# Patient Record
Sex: FEMALE | Race: WHITE | NOT HISPANIC OR LATINO
[De-identification: names, ages, dates, MRNs, and addresses within clinical notes are randomized per-mention and may not be internally consistent; named-entity substitution may affect disease eponyms.]

---

## 2018-08-29 PROBLEM — Z00.00 ENCOUNTER FOR PREVENTIVE HEALTH EXAMINATION: Status: ACTIVE | Noted: 2018-08-29

## 2018-09-25 ENCOUNTER — APPOINTMENT (OUTPATIENT)
Dept: ORTHOPEDIC SURGERY | Facility: CLINIC | Age: 83
End: 2018-09-25

## 2022-03-06 ENCOUNTER — INPATIENT (INPATIENT)
Facility: HOSPITAL | Age: 87
LOS: 8 days | Discharge: HOME | End: 2022-03-15
Attending: INTERNAL MEDICINE | Admitting: INTERNAL MEDICINE
Payer: MEDICARE

## 2022-03-06 VITALS
WEIGHT: 199.96 LBS | RESPIRATION RATE: 19 BRPM | TEMPERATURE: 101 F | SYSTOLIC BLOOD PRESSURE: 165 MMHG | DIASTOLIC BLOOD PRESSURE: 84 MMHG | OXYGEN SATURATION: 97 % | HEART RATE: 112 BPM

## 2022-03-06 DIAGNOSIS — K52.9 NONINFECTIVE GASTROENTERITIS AND COLITIS, UNSPECIFIED: ICD-10-CM

## 2022-03-06 DIAGNOSIS — N39.0 URINARY TRACT INFECTION, SITE NOT SPECIFIED: ICD-10-CM

## 2022-03-06 DIAGNOSIS — A41.9 SEPSIS, UNSPECIFIED ORGANISM: ICD-10-CM

## 2022-03-06 DIAGNOSIS — E86.0 DEHYDRATION: ICD-10-CM

## 2022-03-06 LAB
ALBUMIN SERPL ELPH-MCNC: 3 G/DL — LOW (ref 3.3–5)
ALP SERPL-CCNC: 108 U/L — SIGNIFICANT CHANGE UP (ref 40–120)
ALT FLD-CCNC: 13 U/L — SIGNIFICANT CHANGE UP (ref 12–78)
ANION GAP SERPL CALC-SCNC: 3 MMOL/L — LOW (ref 5–17)
APPEARANCE UR: ABNORMAL
APTT BLD: 32 SEC — SIGNIFICANT CHANGE UP (ref 27.5–35.5)
AST SERPL-CCNC: 21 U/L — SIGNIFICANT CHANGE UP (ref 15–37)
BACTERIA # UR AUTO: ABNORMAL
BASOPHILS # BLD AUTO: 0 K/UL — SIGNIFICANT CHANGE UP (ref 0–0.2)
BASOPHILS NFR BLD AUTO: 0 % — SIGNIFICANT CHANGE UP (ref 0–2)
BILIRUB SERPL-MCNC: 0.4 MG/DL — SIGNIFICANT CHANGE UP (ref 0.2–1.2)
BILIRUB UR-MCNC: NEGATIVE — SIGNIFICANT CHANGE UP
BUN SERPL-MCNC: 24 MG/DL — HIGH (ref 7–23)
CALCIUM SERPL-MCNC: 9.3 MG/DL — SIGNIFICANT CHANGE UP (ref 8.5–10.1)
CHLORIDE SERPL-SCNC: 110 MMOL/L — HIGH (ref 96–108)
CO2 SERPL-SCNC: 30 MMOL/L — SIGNIFICANT CHANGE UP (ref 22–31)
COLOR SPEC: YELLOW — SIGNIFICANT CHANGE UP
CREAT SERPL-MCNC: 1.02 MG/DL — SIGNIFICANT CHANGE UP (ref 0.5–1.3)
DIFF PNL FLD: ABNORMAL
EGFR: 54 ML/MIN/1.73M2 — LOW
EOSINOPHIL # BLD AUTO: 0.14 K/UL — SIGNIFICANT CHANGE UP (ref 0–0.5)
EOSINOPHIL NFR BLD AUTO: 1 % — SIGNIFICANT CHANGE UP (ref 0–6)
EPI CELLS # UR: SIGNIFICANT CHANGE UP
FLUAV AG NPH QL: SIGNIFICANT CHANGE UP
FLUBV AG NPH QL: SIGNIFICANT CHANGE UP
GLUCOSE SERPL-MCNC: 122 MG/DL — HIGH (ref 70–99)
GLUCOSE UR QL: NEGATIVE MG/DL — SIGNIFICANT CHANGE UP
HCT VFR BLD CALC: 39.1 % — SIGNIFICANT CHANGE UP (ref 34.5–45)
HGB BLD-MCNC: 12.3 G/DL — SIGNIFICANT CHANGE UP (ref 11.5–15.5)
INR BLD: 0.98 RATIO — SIGNIFICANT CHANGE UP (ref 0.88–1.16)
KETONES UR-MCNC: NEGATIVE — SIGNIFICANT CHANGE UP
LACTATE SERPL-SCNC: 1.7 MMOL/L — SIGNIFICANT CHANGE UP (ref 0.7–2)
LACTATE SERPL-SCNC: 2.3 MMOL/L — HIGH (ref 0.7–2)
LEUKOCYTE ESTERASE UR-ACNC: ABNORMAL
LYMPHOCYTES # BLD AUTO: 0.14 K/UL — LOW (ref 1–3.3)
LYMPHOCYTES # BLD AUTO: 1 % — LOW (ref 13–44)
MANUAL SMEAR VERIFICATION: SIGNIFICANT CHANGE UP
MCHC RBC-ENTMCNC: 29.3 PG — SIGNIFICANT CHANGE UP (ref 27–34)
MCHC RBC-ENTMCNC: 31.5 G/DL — LOW (ref 32–36)
MCV RBC AUTO: 93.1 FL — SIGNIFICANT CHANGE UP (ref 80–100)
MONOCYTES # BLD AUTO: 0.83 K/UL — SIGNIFICANT CHANGE UP (ref 0–0.9)
MONOCYTES NFR BLD AUTO: 6 % — SIGNIFICANT CHANGE UP (ref 2–14)
NEUTROPHILS # BLD AUTO: 12.77 K/UL — HIGH (ref 1.8–7.4)
NEUTROPHILS NFR BLD AUTO: 92 % — HIGH (ref 43–77)
NITRITE UR-MCNC: POSITIVE
NRBC # BLD: 0 /100 — SIGNIFICANT CHANGE UP (ref 0–0)
NRBC # BLD: SIGNIFICANT CHANGE UP /100 WBCS (ref 0–0)
PH UR: 6.5 — SIGNIFICANT CHANGE UP (ref 5–8)
PLAT MORPH BLD: NORMAL — SIGNIFICANT CHANGE UP
PLATELET # BLD AUTO: 199 K/UL — SIGNIFICANT CHANGE UP (ref 150–400)
POTASSIUM SERPL-MCNC: 3.9 MMOL/L — SIGNIFICANT CHANGE UP (ref 3.5–5.3)
POTASSIUM SERPL-SCNC: 3.9 MMOL/L — SIGNIFICANT CHANGE UP (ref 3.5–5.3)
PROCALCITONIN SERPL-MCNC: 0.45 NG/ML — HIGH (ref 0.02–0.1)
PROT SERPL-MCNC: 7.8 GM/DL — SIGNIFICANT CHANGE UP (ref 6–8.3)
PROT UR-MCNC: 30 MG/DL
PROTHROM AB SERPL-ACNC: 11.7 SEC — SIGNIFICANT CHANGE UP (ref 10.5–13.4)
RBC # BLD: 4.2 M/UL — SIGNIFICANT CHANGE UP (ref 3.8–5.2)
RBC # FLD: 15.1 % — HIGH (ref 10.3–14.5)
RBC BLD AUTO: NORMAL — SIGNIFICANT CHANGE UP
RBC CASTS # UR COMP ASSIST: ABNORMAL /HPF (ref 0–4)
SARS-COV-2 RNA SPEC QL NAA+PROBE: SIGNIFICANT CHANGE UP
SODIUM SERPL-SCNC: 143 MMOL/L — SIGNIFICANT CHANGE UP (ref 135–145)
SP GR SPEC: 1.01 — SIGNIFICANT CHANGE UP (ref 1.01–1.02)
UROBILINOGEN FLD QL: NEGATIVE MG/DL — SIGNIFICANT CHANGE UP
WBC # BLD: 13.88 K/UL — HIGH (ref 3.8–10.5)
WBC # FLD AUTO: 13.88 K/UL — HIGH (ref 3.8–10.5)
WBC UR QL: ABNORMAL

## 2022-03-06 PROCEDURE — 93010 ELECTROCARDIOGRAM REPORT: CPT

## 2022-03-06 PROCEDURE — 99285 EMERGENCY DEPT VISIT HI MDM: CPT

## 2022-03-06 PROCEDURE — 74177 CT ABD & PELVIS W/CONTRAST: CPT | Mod: 26,MA

## 2022-03-06 PROCEDURE — 71045 X-RAY EXAM CHEST 1 VIEW: CPT | Mod: 26

## 2022-03-06 RX ORDER — SODIUM CHLORIDE 9 MG/ML
2200 INJECTION, SOLUTION INTRAVENOUS ONCE
Refills: 0 | Status: DISCONTINUED | OUTPATIENT
Start: 2022-03-06 | End: 2022-03-06

## 2022-03-06 RX ORDER — HEPARIN SODIUM 5000 [USP'U]/ML
5000 INJECTION INTRAVENOUS; SUBCUTANEOUS EVERY 12 HOURS
Refills: 0 | Status: DISCONTINUED | OUTPATIENT
Start: 2022-03-06 | End: 2022-03-15

## 2022-03-06 RX ORDER — SODIUM CHLORIDE 9 MG/ML
1000 INJECTION, SOLUTION INTRAVENOUS ONCE
Refills: 0 | Status: COMPLETED | OUTPATIENT
Start: 2022-03-06 | End: 2022-03-06

## 2022-03-06 RX ORDER — METRONIDAZOLE 500 MG
500 TABLET ORAL ONCE
Refills: 0 | Status: COMPLETED | OUTPATIENT
Start: 2022-03-06 | End: 2022-03-06

## 2022-03-06 RX ORDER — PANTOPRAZOLE SODIUM 20 MG/1
40 TABLET, DELAYED RELEASE ORAL ONCE
Refills: 0 | Status: COMPLETED | OUTPATIENT
Start: 2022-03-06 | End: 2022-03-06

## 2022-03-06 RX ORDER — PIPERACILLIN AND TAZOBACTAM 4; .5 G/20ML; G/20ML
3.38 INJECTION, POWDER, LYOPHILIZED, FOR SOLUTION INTRAVENOUS ONCE
Refills: 0 | Status: COMPLETED | OUTPATIENT
Start: 2022-03-06 | End: 2022-03-06

## 2022-03-06 RX ORDER — ONDANSETRON 8 MG/1
4 TABLET, FILM COATED ORAL EVERY 8 HOURS
Refills: 0 | Status: DISCONTINUED | OUTPATIENT
Start: 2022-03-06 | End: 2022-03-15

## 2022-03-06 RX ORDER — METRONIDAZOLE 500 MG
500 TABLET ORAL EVERY 8 HOURS
Refills: 0 | Status: DISCONTINUED | OUTPATIENT
Start: 2022-03-06 | End: 2022-03-08

## 2022-03-06 RX ORDER — ACETAMINOPHEN 500 MG
650 TABLET ORAL EVERY 6 HOURS
Refills: 0 | Status: DISCONTINUED | OUTPATIENT
Start: 2022-03-06 | End: 2022-03-15

## 2022-03-06 RX ORDER — ONDANSETRON 8 MG/1
4 TABLET, FILM COATED ORAL ONCE
Refills: 0 | Status: COMPLETED | OUTPATIENT
Start: 2022-03-06 | End: 2022-03-06

## 2022-03-06 RX ORDER — PIPERACILLIN AND TAZOBACTAM 4; .5 G/20ML; G/20ML
3.38 INJECTION, POWDER, LYOPHILIZED, FOR SOLUTION INTRAVENOUS EVERY 8 HOURS
Refills: 0 | Status: DISCONTINUED | OUTPATIENT
Start: 2022-03-06 | End: 2022-03-07

## 2022-03-06 RX ORDER — ACETAMINOPHEN 500 MG
650 TABLET ORAL ONCE
Refills: 0 | Status: COMPLETED | OUTPATIENT
Start: 2022-03-06 | End: 2022-03-06

## 2022-03-06 RX ORDER — METRONIDAZOLE 500 MG
TABLET ORAL
Refills: 0 | Status: DISCONTINUED | OUTPATIENT
Start: 2022-03-06 | End: 2022-03-08

## 2022-03-06 RX ORDER — LANOLIN ALCOHOL/MO/W.PET/CERES
3 CREAM (GRAM) TOPICAL AT BEDTIME
Refills: 0 | Status: DISCONTINUED | OUTPATIENT
Start: 2022-03-06 | End: 2022-03-15

## 2022-03-06 RX ORDER — SODIUM CHLORIDE 9 MG/ML
1000 INJECTION INTRAMUSCULAR; INTRAVENOUS; SUBCUTANEOUS
Refills: 0 | Status: DISCONTINUED | OUTPATIENT
Start: 2022-03-06 | End: 2022-03-08

## 2022-03-06 RX ADMIN — ONDANSETRON 4 MILLIGRAM(S): 8 TABLET, FILM COATED ORAL at 08:29

## 2022-03-06 RX ADMIN — PANTOPRAZOLE SODIUM 40 MILLIGRAM(S): 20 TABLET, DELAYED RELEASE ORAL at 16:13

## 2022-03-06 RX ADMIN — HEPARIN SODIUM 5000 UNIT(S): 5000 INJECTION INTRAVENOUS; SUBCUTANEOUS at 14:00

## 2022-03-06 RX ADMIN — SODIUM CHLORIDE 1000 MILLILITER(S): 9 INJECTION, SOLUTION INTRAVENOUS at 08:30

## 2022-03-06 RX ADMIN — Medication 650 MILLIGRAM(S): at 10:00

## 2022-03-06 RX ADMIN — PIPERACILLIN AND TAZOBACTAM 200 GRAM(S): 4; .5 INJECTION, POWDER, LYOPHILIZED, FOR SOLUTION INTRAVENOUS at 12:56

## 2022-03-06 RX ADMIN — Medication 100 MILLIGRAM(S): at 15:58

## 2022-03-06 RX ADMIN — Medication 100 MILLIGRAM(S): at 22:22

## 2022-03-06 RX ADMIN — Medication 650 MILLIGRAM(S): at 08:30

## 2022-03-06 RX ADMIN — PIPERACILLIN AND TAZOBACTAM 200 GRAM(S): 4; .5 INJECTION, POWDER, LYOPHILIZED, FOR SOLUTION INTRAVENOUS at 22:22

## 2022-03-06 RX ADMIN — PIPERACILLIN AND TAZOBACTAM 200 GRAM(S): 4; .5 INJECTION, POWDER, LYOPHILIZED, FOR SOLUTION INTRAVENOUS at 16:09

## 2022-03-06 NOTE — H&P ADULT - HISTORY OF PRESENT ILLNESS
87yo, WF with h/o  HLD, Depression, HTN, Lasix for LE edema BIBEMS from care home  for nausea, vomiting  and diarrhea x 3 days. non-bloody diarrhea x 3 episodes per pt, vomiting and persistent nausea - no noted previous abdominal surgeries or scars on examination -non-distended - passing gas. Patient denies sick contacts.  Denies CP, SOB, PND, cough, palpitation, chills, weakness, abdominal pain, dysuria ,HA, dizziness.    In ER,  rectal temp of 103 F, low grade tachycardia 103 Sinus tachycardia on EKG.

## 2022-03-06 NOTE — ED PROVIDER NOTE - OBJECTIVE STATEMENT
"Chief Complaint   Patient presents with     RECHECK   chronic pain    initial /87   Pulse 93   Temp 97  F (36.1  C) (Tympanic)   Resp 16   Ht 1.74 m (5' 8.5\")   Wt 111.1 kg (245 lb)   SpO2 96%   BMI 36.71 kg/m   Estimated body mass index is 36.71 kg/m  as calculated from the following:    Height as of this encounter: 1.74 m (5' 8.5\").    Weight as of this encounter: 111.1 kg (245 lb).  BP completed using cuff size: large.   R arm      Health Maintenance that is potentially due pending provider review:  NONE    n/a    Brian Sheffield ma  " 86 F With hx of HLD, Depression, HTN, on lasix for LE edema BIBEMS from [facility]  for nausea, vomiting nbnb and diarrhea x 3 days. non-bloody diarrhea x 3 episodes per pt, vomiting and persistent nausea - no noted previous abdominal surgeries or scars on examination -non-distended - passing gas. Patient denies sick contacts. noted to have rectal temp of 103 F, low grade tachycardia 103 Sinus tachycardia on EKG.

## 2022-03-06 NOTE — ED PROVIDER NOTE - NS ED ROS FT
Constitutional: See HPI.  Eyes: No visual changes, eye pain or discharge. No Photophobia  ENMT: No hearing changes, pain, discharge or infections. No neck pain or stiffness. No limited ROM  Cardiac: No SOB or edema. No chest pain with exertion.  Respiratory: No cough or respiratory distress.   GI: see hpi  : No dysuria, frequency or burning. No Discharge  MS: No myalgia, muscle weakness, joint pain or back pain.  Neuro: No headache or weakness. No LOC.  Skin: No skin rash.  Except as documented in the HPI, all other systems are negative.

## 2022-03-06 NOTE — H&P ADULT - NSHPPHYSICALEXAM_GEN_ALL_CORE
PHYSICAL EXAM:  GENERAL: NAD,   HEAD:  Atraumatic, Normocephalic  EYES:  PERRLA, conjunctiva and sclera clear  ENMT:   Dry mucous membranes, No lesions  NECK: Supple,   NERVOUS SYSTEM:  Alert ; Motor Strength 4/5   CHEST/LUNG: Clear bilaterally; No rales, rhonchi, wheezing, or rubs  HEART: Regular rate and rhythm; No murmurs, rubs, or gallops  ABDOMEN: Soft, Nontender, Nondistended; Bowel sounds present  EXTREMITIES:  No clubbing, cyanosis.   1+ edema  SKIN: No rashes or lesions

## 2022-03-06 NOTE — PATIENT PROFILE ADULT - FALL HARM RISK - HARM RISK INTERVENTIONS
Assistance with ambulation/Assistance OOB with selected safe patient handling equipment/Communicate Risk of Fall with Harm to all staff/Discuss with provider need for PT consult/Monitor gait and stability/Provide patient with walking aids - walker, cane, crutches/Reinforce activity limits and safety measures with patient and family/Tailored Fall Risk Interventions/Visual Cue: Yellow wristband and red socks/Bed in lowest position, wheels locked, appropriate side rails in place/Call bell, personal items and telephone in reach/Instruct patient to call for assistance before getting out of bed or chair/Non-slip footwear when patient is out of bed/[place] to call system/Physically safe environment - no spills, clutter or unnecessary equipment/Purposeful Proactive Rounding/Room/bathroom lighting operational, light cord in reach Assistance with ambulation/Assistance OOB with selected safe patient handling equipment/Communicate Risk of Fall with Harm to all staff/Discuss with provider need for PT consult/Monitor gait and stability/Reinforce activity limits and safety measures with patient and family/Tailored Fall Risk Interventions/Visual Cue: Yellow wristband and red socks/Bed in lowest position, wheels locked, appropriate side rails in place/Call bell, personal items and telephone in reach/Instruct patient to call for assistance before getting out of bed or chair/Non-slip footwear when patient is out of bed/[place] to call system/Physically safe environment - no spills, clutter or unnecessary equipment/Purposeful Proactive Rounding/Room/bathroom lighting operational, light cord in reach Assistance with ambulation/Assistance OOB with selected safe patient handling equipment/Communicate Risk of Fall with Harm to all staff/Discuss with provider need for PT consult/Monitor gait and stability/Provide patient with walking aids - walker, cane, crutches/Reinforce activity limits and safety measures with patient and family/Tailored Fall Risk Interventions/Visual Cue: Yellow wristband and red socks/Bed in lowest position, wheels locked, appropriate side rails in place/Call bell, personal items and telephone in reach/Instruct patient to call for assistance before getting out of bed or chair/Non-slip footwear when patient is out of bed/[place] to call system/Physically safe environment - no spills, clutter or unnecessary equipment/Purposeful Proactive Rounding/Room/bathroom lighting operational, light cord in reach

## 2022-03-06 NOTE — H&P ADULT - NSHPLABSRESULTS_GEN_ALL_CORE
LABS:                        12.3   13.88 )-----------( 199      ( 06 Mar 2022 08:47 )             39.1     03-06    143  |  110<H>  |  24<H>  ----------------------------<  122<H>  3.9   |  30  |  1.02    Ca    9.3      06 Mar 2022 08:47    TPro  7.8  /  Alb  3.0<L>  /  TBili  0.4  /  DBili  x   /  AST  21  /  ALT  13  /  AlkPhos  108  03-06    PT/INR - ( 06 Mar 2022 08:47 )   PT: 11.7 sec;   INR: 0.98 ratio         PTT - ( 06 Mar 2022 08:47 )  PTT:32.0 sec  Urinalysis Basic - ( 06 Mar 2022 10:11 )    Color: Yellow / Appearance: Slightly Turbid / S.010 / pH: x  Gluc: x / Ketone: Negative  / Bili: Negative / Urobili: Negative mg/dL   Blood: x / Protein: 30 mg/dL / Nitrite: Positive   Leuk Esterase: Moderate / RBC: 11-25 /HPF / WBC 11-25   Sq Epi: x / Non Sq Epi: Few / Bacteria: Many      Lactate, Blood: 1.7 mmol/L (22 @ 11:45)   Lactate, Blood: 2.3: Elevated lactate. Consider ordering follow-up lactate to trend. mmol/L (22 @ 08:47)         < from: CT Abdomen and Pelvis w/ IV Cont (22 @ 10:22) >    FINDINGS:  LOWER CHEST: There is a moderate amount of fat herniating through the   esophageal hiatus.    LIVER: Subcentimeter hypodensity right lobe.  BILE DUCTS: Normal caliber.  GALLBLADDER: Removed.  SPLEEN: Within normal limits.  PANCREAS: There is a 1.7 cm cystic lesion in the pancreatic tail.  ADRENALS: Within normal limits.  KIDNEYS/URETERS: There is a 2 mm nonobstructing left lower pole renal   calculus. There are bilateral renal cysts and subcentimeter renal   hypodensities with the largest cyst measuring 4.9 cm in the upper pole of   the right kidney.    BLADDER: Diffuse bladder wall thickening.  REPRODUCTIVE ORGANS: Uterus and adnexa within normal limits.    BOWEL: There are a few mildly dilated loops of small bowel in the   midabdomen which demonstrate mild thickening including on series 2 image   69. There is no discrete transition zone to suggest bowel obstruction and   findings may be on the basis of enteritis.  PERITONEUM: No ascites.  VESSELS: Within normal limits.  RETROPERITONEUM/LYMPH NODES: No lymphadenopathy.  ABDOMINAL WALL: Small fat-containing umbilical hernia.  BONES: Old right inferior pubic ramus fracture.    IMPRESSION:  Few mildly thickened and dilated loops of small bowel in the upper to mid   abdomen suggesting enteritis. No transition zone to suggest bowel   obstruction.    Thickened bladder wall. Correlation with urinalysis recommended.    Fat herniation through the esophageal hiatus into the middle mediastinum.    1.7 cm cystic lesion pancreatic tail.Correlation with contrast enhanced   abdominal MRI recommended as an outpatient.      < end of copied text >

## 2022-03-06 NOTE — ED ADULT NURSE NOTE - NSIMPLEMENTINTERV_GEN_ALL_ED
Implemented All Fall with Harm Risk Interventions:  [place] to call system. Call bell, personal items and telephone within reach. Instruct patient to call for assistance. Room bathroom lighting operational. Non-slip footwear when patient is off stretcher. Physically safe environment: no spills, clutter or unnecessary equipment. Stretcher in lowest position, wheels locked, appropriate side rails in place. Provide visual cue, wrist band, yellow gown, etc. Monitor gait and stability. Monitor for mental status changes and reorient to person, place, and time. Review medications for side effects contributing to fall risk. Reinforce activity limits and safety measures with patient and family. Provide visual clues: red socks.

## 2022-03-06 NOTE — ED PROVIDER NOTE - PHYSICAL EXAMINATION
VITAL SIGNS: I have reviewed nursing notes and confirm.  CONSTITUTIONAL: well-appearing, non-toxic  SKIN: Warm dry, normal skin turgor  HEAD: NCAT  EYES: EOMI, PERRLA, no scleral icterus  ENT: Moist mucous membranes, normal pharynx   NECK: Supple; non tender. Full ROM.  CARD: tachycardia , no murmurs, rubs or gallops  RESP: clear to ausculation b/l.  No rales, rhonchi, or wheezing.  ABD: soft, + BS, non-tender, non-distended, no rebound or guarding. No CVA tenderness  EXT: Full ROM, no bony tenderness, no pedal edema, no calf tenderness  NEURO: non-ofcal   PSYCH: Cooperative, appropriate.

## 2022-03-06 NOTE — ED ADULT NURSE NOTE - OBJECTIVE STATEMENT
pt is 85 y/o female c/c of weakness, nausea, flu like symptoms x couple days. upon receiving pt, found pt covered in large light brown feces. pt changed with 2 assist. AAOX3, appears lethargic, partial facial droop noted. skin hot to touch. septic workup initiated,. pt rectal temp 103, skin intact. placed on cardiac monitor/cont pulse ox, on 2L O2 NC, bilateral anterior chest diminished lung sounds, no increased work of breathing. pt states lives at home with children, non ambulatory at this time. fall with harm risk and safety precautions maintained. unknown PMH.

## 2022-03-07 LAB
ALBUMIN SERPL ELPH-MCNC: 2.3 G/DL — LOW (ref 3.3–5)
ALP SERPL-CCNC: 71 U/L — SIGNIFICANT CHANGE UP (ref 40–120)
ALT FLD-CCNC: 15 U/L — SIGNIFICANT CHANGE UP (ref 12–78)
ANION GAP SERPL CALC-SCNC: 5 MMOL/L — SIGNIFICANT CHANGE UP (ref 5–17)
AST SERPL-CCNC: 25 U/L — SIGNIFICANT CHANGE UP (ref 15–37)
BILIRUB SERPL-MCNC: 0.3 MG/DL — SIGNIFICANT CHANGE UP (ref 0.2–1.2)
BUN SERPL-MCNC: 18 MG/DL — SIGNIFICANT CHANGE UP (ref 7–23)
CALCIUM SERPL-MCNC: 8 MG/DL — LOW (ref 8.5–10.1)
CHLORIDE SERPL-SCNC: 111 MMOL/L — HIGH (ref 96–108)
CO2 SERPL-SCNC: 28 MMOL/L — SIGNIFICANT CHANGE UP (ref 22–31)
CREAT SERPL-MCNC: 0.92 MG/DL — SIGNIFICANT CHANGE UP (ref 0.5–1.3)
CULTURE RESULTS: SIGNIFICANT CHANGE UP
EGFR: 61 ML/MIN/1.73M2 — SIGNIFICANT CHANGE UP
GLUCOSE SERPL-MCNC: 109 MG/DL — HIGH (ref 70–99)
HCT VFR BLD CALC: 34 % — LOW (ref 34.5–45)
HGB BLD-MCNC: 10.3 G/DL — LOW (ref 11.5–15.5)
LACTATE SERPL-SCNC: 1.2 MMOL/L — SIGNIFICANT CHANGE UP (ref 0.7–2)
MCHC RBC-ENTMCNC: 28.9 PG — SIGNIFICANT CHANGE UP (ref 27–34)
MCHC RBC-ENTMCNC: 30.3 G/DL — LOW (ref 32–36)
MCV RBC AUTO: 95.2 FL — SIGNIFICANT CHANGE UP (ref 80–100)
NRBC # BLD: 0 /100 WBCS — SIGNIFICANT CHANGE UP (ref 0–0)
PLATELET # BLD AUTO: 143 K/UL — LOW (ref 150–400)
POTASSIUM SERPL-MCNC: 3.6 MMOL/L — SIGNIFICANT CHANGE UP (ref 3.5–5.3)
POTASSIUM SERPL-SCNC: 3.6 MMOL/L — SIGNIFICANT CHANGE UP (ref 3.5–5.3)
PROCALCITONIN SERPL-MCNC: 3.07 NG/ML — HIGH (ref 0.02–0.1)
PROT SERPL-MCNC: 6.3 GM/DL — SIGNIFICANT CHANGE UP (ref 6–8.3)
RBC # BLD: 3.57 M/UL — LOW (ref 3.8–5.2)
RBC # FLD: 15.5 % — HIGH (ref 10.3–14.5)
SODIUM SERPL-SCNC: 144 MMOL/L — SIGNIFICANT CHANGE UP (ref 135–145)
SPECIMEN SOURCE: SIGNIFICANT CHANGE UP
WBC # BLD: 6.12 K/UL — SIGNIFICANT CHANGE UP (ref 3.8–10.5)
WBC # FLD AUTO: 6.12 K/UL — SIGNIFICANT CHANGE UP (ref 3.8–10.5)

## 2022-03-07 RX ORDER — CEFTRIAXONE 500 MG/1
1000 INJECTION, POWDER, FOR SOLUTION INTRAMUSCULAR; INTRAVENOUS ONCE
Refills: 0 | Status: COMPLETED | OUTPATIENT
Start: 2022-03-07 | End: 2022-03-07

## 2022-03-07 RX ORDER — CEFTRIAXONE 500 MG/1
1000 INJECTION, POWDER, FOR SOLUTION INTRAMUSCULAR; INTRAVENOUS EVERY 24 HOURS
Refills: 0 | Status: DISCONTINUED | OUTPATIENT
Start: 2022-03-08 | End: 2022-03-10

## 2022-03-07 RX ORDER — CEFTRIAXONE 500 MG/1
INJECTION, POWDER, FOR SOLUTION INTRAMUSCULAR; INTRAVENOUS
Refills: 0 | Status: DISCONTINUED | OUTPATIENT
Start: 2022-03-07 | End: 2022-03-10

## 2022-03-07 RX ADMIN — Medication 100 MILLIGRAM(S): at 15:06

## 2022-03-07 RX ADMIN — HEPARIN SODIUM 5000 UNIT(S): 5000 INJECTION INTRAVENOUS; SUBCUTANEOUS at 05:22

## 2022-03-07 RX ADMIN — PIPERACILLIN AND TAZOBACTAM 200 GRAM(S): 4; .5 INJECTION, POWDER, LYOPHILIZED, FOR SOLUTION INTRAVENOUS at 05:21

## 2022-03-07 RX ADMIN — Medication 100 MILLIGRAM(S): at 05:21

## 2022-03-07 RX ADMIN — CEFTRIAXONE 100 MILLIGRAM(S): 500 INJECTION, POWDER, FOR SOLUTION INTRAMUSCULAR; INTRAVENOUS at 14:11

## 2022-03-07 RX ADMIN — SODIUM CHLORIDE 70 MILLILITER(S): 9 INJECTION INTRAMUSCULAR; INTRAVENOUS; SUBCUTANEOUS at 03:47

## 2022-03-07 RX ADMIN — Medication 100 MILLIGRAM(S): at 21:22

## 2022-03-07 RX ADMIN — HEPARIN SODIUM 5000 UNIT(S): 5000 INJECTION INTRAVENOUS; SUBCUTANEOUS at 17:33

## 2022-03-07 RX ADMIN — SODIUM CHLORIDE 70 MILLILITER(S): 9 INJECTION INTRAMUSCULAR; INTRAVENOUS; SUBCUTANEOUS at 18:12

## 2022-03-07 NOTE — PHARMACOTHERAPY INTERVENTION NOTE - COMMENTS
Recommended de-escalating from piperacillin/tazobactam to ceftriaxone (if concerned for bacterial enteritis/UTI and if no recent IV antibiotic use in the last 90 days) to be used concomitantly with metronidazole (avoids double anaerobic coverage by de-escalating piperacillin/tazobactam) for enteritis. Metronidazole was unsuspended to allow for single anaerobic coverage.

## 2022-03-07 NOTE — PHARMACOTHERAPY INTERVENTION NOTE - COMMENTS
Suspended metronidazole since the patient is on piperacillin/tazobactam and this combination provides double anaerobic coverage (unnecessary in this case, as per double anaerobic coverage guideline). Would recommend d/c metronidazole if continuing piperacillin/tazobactam, or de-escalating piperacillin/tazobactam to ceftriaxone (if true concern for UTI; no urinary symptoms mentioned) and continuing metronidazole (for enteritis). Suspended metronidazole since the patient is on piperacillin/tazobactam and this combination provides double anaerobic coverage (unnecessary in this case, as per double anaerobic coverage guideline). Would recommend d/c metronidazole if continuing piperacillin/tazobactam, or de-escalating piperacillin/tazobactam to ceftriaxone (if true concern for UTI and if no IV antibiotics in last 90 days; no urinary symptoms mentioned) and continuing metronidazole (for enteritis).

## 2022-03-07 NOTE — PHARMACOTHERAPY INTERVENTION NOTE - COMMENTS
Recommended stool culture/O&P and GI PCR since patient is being treated with antibiotics for enteritis.

## 2022-03-07 NOTE — CONSULT NOTE ADULT - ASSESSMENT
HPI:  87yo, WF with h/o  HLD, Depression, HTN, Lasix for LE edema BIBEMS from [facility]  for nausea, vomiting  and diarrhea x 3 days. non-bloody diarrhea x 3 episodes per pt, vomiting and persistent nausea - no noted previous abdominal surgeries or scars on examination -non-distended - passing gas. Patient denies sick contacts.  Denies CP, SOB, PND, cough, palpitation, chills, weakness, abdominal pain, dysuria ,HA, dizziness.    In ER,  rectal temp of 103 F, low grade tachycardia 103 Sinus tachycardia on EKG.     (06 Mar 2022 13:59)  ----- As Above -------- Patient is a fair historian. Could not contact family  Patient presented with N/V, abdominal pain and diarrhea x 3 days. Febrile (+)Non bloody. No one else with same symptoms. No history of IBD.   Prior to these symptoms, the patient denies melena, hematochezia, hematemesis, nausea, vomiting, abdominal pain, constipation, diarrhea, or change in bowel movements   Colonoscopy (?)       N/V, abdominal pain, diarrhea, fever, abnormal CT scan - c/w Gastroenteritis - Patient doing much better this afternoon. 1)  HPI:  85yo, WF with h/o  HLD, Depression, HTN, Lasix for LE edema BIBEMS from [facility]  for nausea, vomiting  and diarrhea x 3 days. non-bloody diarrhea x 3 episodes per pt, vomiting and persistent nausea - no noted previous abdominal surgeries or scars on examination -non-distended - passing gas. Patient denies sick contacts.  Denies CP, SOB, PND, cough, palpitation, chills, weakness, abdominal pain, dysuria ,HA, dizziness.    In ER,  rectal temp of 103 F, low grade tachycardia 103 Sinus tachycardia on EKG.     (06 Mar 2022 13:59)  ----- As Above -------- Patient is a fair historian. Could not contact family  Patient presented with N/V, abdominal pain and diarrhea x 3 days. Febrile (+)Non bloody. No one else with same symptoms. No history of IBD.   Prior to these symptoms, the patient denies melena, hematochezia, hematemesis, nausea, vomiting, abdominal pain, constipation, diarrhea, or change in bowel movements   Colonoscopy (?)       A) N/V, abdominal pain, diarrhea, fever, abnormal CT scan - c/w Gastroenteritis - Patient doing much better this afternoon. Tolerating clear liquids  1) Full liquid diet 2) CRP 3) Otherwise, supportive care 4) No need for antibiotics from GI point of view  B) Anemia - Acute drop probably from rehydration - Blood work ordered.   ==== Will again attempt to speak with family

## 2022-03-07 NOTE — CONSULT NOTE ADULT - SUBJECTIVE AND OBJECTIVE BOX
HPI:  87yo, WF with h/o  HLD, Depression, HTN, Lasix for LE edema BIBEMS from [facility]  for nausea, vomiting  and diarrhea x 3 days. non-bloody diarrhea x 3 episodes per pt, vomiting and persistent nausea - no noted previous abdominal surgeries or scars on examination -non-distended - passing gas. Patient denies sick contacts.  Denies CP, SOB, PND, cough, palpitation, chills, weakness, abdominal pain, dysuria ,HA, dizziness.    In ER,  rectal temp of 103 F, low grade tachycardia 103 Sinus tachycardia on EKG.     (06 Mar 2022 13:59)  ----- As Above -------- Patient is a fair historian. Could not contact family  Patient presented with N/V, abdominal pain and diarrhea x 3 days. Febrile (+)Non bloody. No one else with same symptoms. No history of IBD.   Prior to these symptoms, the patient denies melena, hematochezia, hematemesis, nausea, vomiting, abdominal pain, constipation, diarrhea, or change in bowel movements   Colonoscopy (?)       PAST MEDICAL & SURGICAL HISTORY:       MEDICATIONS  (STANDING):  cefTRIAXone   IVPB      heparin   Injectable 5000 Unit(s) SubCutaneous every 12 hours  metroNIDAZOLE  IVPB      metroNIDAZOLE  IVPB 500 milliGRAM(s) IV Intermittent every 8 hours  sodium chloride 0.9%. 1000 milliLiter(s) (70 mL/Hr) IV Continuous <Continuous>    MEDICATIONS  (PRN):  acetaminophen     Tablet .. 650 milliGRAM(s) Oral every 6 hours PRN Temp greater or equal to 38C (100.4F), Mild Pain (1 - 3)  aluminum hydroxide/magnesium hydroxide/simethicone Suspension 30 milliLiter(s) Oral every 4 hours PRN Dyspepsia  melatonin 3 milliGRAM(s) Oral at bedtime PRN Insomnia  ondansetron Injectable 4 milliGRAM(s) IV Push every 8 hours PRN Nausea and/or Vomiting      Allergies    No Known Allergies    Intolerances        FAMILY HISTORY:      REVIEW OF SYSTEMS:    CONSTITUTIONAL: No fever, weight loss,   EYES: No eye pain, visual disturbances, or discharge  ENMT:  No difficulty hearing, tinnitus, vertigo; No sinus or throat pain  NECK: No pain or stiffness  BREASTS: No pain, masses, or nipple discharge  RESPIRATORY: No cough, wheezing, chills or hemoptysis; No shortness of breath  CARDIOVASCULAR: No chest pain, palpitations, dizziness, or leg swelling  GASTROINTESTINAL: See above   GENITOURINARY: No dysuria, frequency, hematuria, or incontinence  NEUROLOGICAL: No headaches, memory loss, loss of strength, numbness, or tremors  SKIN: No itching, burning, rashes, or lesions   LYMPH NODES: No enlarged glands  ENDOCRINE: No heat or cold intolerance; No hair loss  MUSCULOSKELETAL: No joint pain or swelling; No muscle, back, or extremity pain  PSYCHIATRIC: No depression, anxiety, mood swings, or difficulty sleeping  HEME/LYMPH: No easy bruising, or bleeding gums  ALLERGY AND IMMUNOLOGIC: No hives or eczema          SOCIAL HISTORY:    FAMILY HISTORY:      Vital Signs Last 24 Hrs  T(C): 37 (07 Mar 2022 11:18), Max: 37.8 (07 Mar 2022 00:23)  T(F): 98.6 (07 Mar 2022 11:18), Max: 100 (07 Mar 2022 00:23)  HR: 70 (07 Mar 2022 11:18) (70 - 80)  BP: 138/78 (07 Mar 2022 11:18) (134/74 - 138/78)  BP(mean): --  RR: 20 (07 Mar 2022 11:18) (16 - 20)  SpO2: 98% (07 Mar 2022 11:18) (95% - 98%)    PHYSICAL EXAM:    GENERAL: NAD, well-groomed, well-developed  HEAD:  Atraumatic, Normocephalic  EYES: EOMI, PERRLA, conjunctiva and sclera clear  NECK: Supple, No JVD, Normal thyroid  NERVOUS SYSTEM:  Alert & Oriented X3, Good concentration;  CHEST/LUNG: Clear to percussion bilaterally; No rales, rhonchi, wheezing, or rubs  HEART: Regular rate and rhythm; No murmurs, rubs, or gallops  ABDOMEN: Soft, Nontender, Nondistended; Bowel sounds present  EXTREMITIES:  2+ Peripheral Pulses, No clubbing, cyanosis, or edema  LYMPH: No lymphadenopathy noted   RECTAL: Deferred   SKIN: No rashes or lesions    LABS:                        10.3   6.12  )-----------( 143      ( 07 Mar 2022 06:26 )             34.0       CBC:  - @ 06:26  WBC  6.12  HGB 10.3  HCT 34.0 Plate 143  MCV 95.2  03-06 @ 08:47  WBC  13.88  HGB 12.3  HCT 39.1 Plate 199  MCV 93.1           07 Mar 2022 06:26    144    |  111    |  18     ----------------------------<  109    3.6     |  28     |  0.92   06 Mar 2022 08:47    143    |  110    |  24     ----------------------------<  122    3.9     |  30     |  1.02     Ca    8.0        07 Mar 2022 06:26  Ca    9.3        06 Mar 2022 08:47    TPro  6.3    /  Alb  2.3    /  TBili  0.3    /  DBili  x      /  AST  25     /  ALT  15     /  AlkPhos  71     07 Mar 2022 06:26  TPro  7.8    /  Alb  3.0    /  TBili  0.4    /  DBili  x      /  AST  21     /  ALT  13     /  AlkPhos  108    06 Mar 2022 08:47    PT/INR - ( 06 Mar 2022 08:47 )   PT: 11.7 sec;   INR: 0.98 ratio         PTT - ( 06 Mar 2022 08:47 )  PTT:32.0 sec  Urinalysis Basic - ( 06 Mar 2022 10:11 )    Color: Yellow / Appearance: Slightly Turbid / S.010 / pH: x  Gluc: x / Ketone: Negative  / Bili: Negative / Urobili: Negative mg/dL   Blood: x / Protein: 30 mg/dL / Nitrite: Positive   Leuk Esterase: Moderate / RBC: 11-25 /HPF / WBC 11-25   Sq Epi: x / Non Sq Epi: Few / Bacteria: Many          RADIOLOGY & ADDITIONAL STUDIES:  < from: CT Abdomen and Pelvis w/ IV Cont (22 @ 10:22) >    ACC: [id number] EXAM:  CT ABDOMEN AND PELVIS IC                          PROCEDURE DATE:  2022          INTERPRETATION:  CLINICAL INFORMATION: Nausea, vomiting and fever. Poor   historian.    COMPARISON: None.    CONTRAST/COMPLICATIONS:  IV Contrast: Omnipaque 350  99 cc administered   1 cc discarded  Oral Contrast: NONE  Complications: None reported at time of study completion    PROCEDURE:  CT of the Abdomen and Pelvis was performed.  Sagittal and coronal reformats were performed.    FINDINGS:  LOWER CHEST: There is a moderate amount of fat herniating through the   esophageal hiatus.    LIVER: Subcentimeter hypodensity right lobe.  BILE DUCTS: Normal caliber.  GALLBLADDER: Removed.  SPLEEN: Within normal limits.  PANCREAS: There is a 1.7 cm cystic lesion in the pancreatic tail.  ADRENALS: Within normal limits.  KIDNEYS/URETERS: There is a 2 mm nonobstructing left lower pole renal   calculus. There are bilateral renal cysts and subcentimeter renal   hypodensities with the largest cyst measuring 4.9 cm in the upper pole of   the right kidney.    BLADDER: Diffuse bladder wall thickening.  REPRODUCTIVE ORGANS: Uterus and adnexa within normal limits.    BOWEL: There are a few mildly dilated loops of small bowel in the   midabdomen which demonstrate mild thickening including on series 2 image   69. There is no discrete transition zone to suggest bowel obstruction and   findings may be on the basis of enteritis.  PERITONEUM: No ascites.  VESSELS: Within normal limits.  RETROPERITONEUM/LYMPH NODES: No lymphadenopathy.  ABDOMINAL WALL: Small fat-containing umbilical hernia.  BONES: Old right inferior pubic ramus fracture.      < end of copied text >

## 2022-03-08 LAB
ANION GAP SERPL CALC-SCNC: 4 MMOL/L — LOW (ref 5–17)
BUN SERPL-MCNC: 11 MG/DL — SIGNIFICANT CHANGE UP (ref 7–23)
C DIFF BY PCR RESULT: SIGNIFICANT CHANGE UP
C DIFF TOX GENS STL QL NAA+PROBE: SIGNIFICANT CHANGE UP
CALCIUM SERPL-MCNC: 7.9 MG/DL — LOW (ref 8.5–10.1)
CHLORIDE SERPL-SCNC: 113 MMOL/L — HIGH (ref 96–108)
CO2 SERPL-SCNC: 25 MMOL/L — SIGNIFICANT CHANGE UP (ref 22–31)
CREAT SERPL-MCNC: 0.66 MG/DL — SIGNIFICANT CHANGE UP (ref 0.5–1.3)
CRP SERPL-MCNC: 41 MG/L — HIGH
CULTURE RESULTS: SIGNIFICANT CHANGE UP
CULTURE RESULTS: SIGNIFICANT CHANGE UP
EGFR: 86 ML/MIN/1.73M2 — SIGNIFICANT CHANGE UP
FOLATE SERPL-MCNC: 7 NG/ML — SIGNIFICANT CHANGE UP
GLUCOSE SERPL-MCNC: 97 MG/DL — SIGNIFICANT CHANGE UP (ref 70–99)
HCT VFR BLD CALC: 34 % — LOW (ref 34.5–45)
HGB BLD-MCNC: 10.4 G/DL — LOW (ref 11.5–15.5)
MCHC RBC-ENTMCNC: 29 PG — SIGNIFICANT CHANGE UP (ref 27–34)
MCHC RBC-ENTMCNC: 30.6 G/DL — LOW (ref 32–36)
MCV RBC AUTO: 94.7 FL — SIGNIFICANT CHANGE UP (ref 80–100)
NRBC # BLD: 0 /100 WBCS — SIGNIFICANT CHANGE UP (ref 0–0)
PLATELET # BLD AUTO: 154 K/UL — SIGNIFICANT CHANGE UP (ref 150–400)
POTASSIUM SERPL-MCNC: 3.5 MMOL/L — SIGNIFICANT CHANGE UP (ref 3.5–5.3)
POTASSIUM SERPL-SCNC: 3.5 MMOL/L — SIGNIFICANT CHANGE UP (ref 3.5–5.3)
PROCALCITONIN SERPL-MCNC: 1.87 NG/ML — HIGH (ref 0.02–0.1)
RBC # BLD: 3.59 M/UL — LOW (ref 3.8–5.2)
RBC # FLD: 15.3 % — HIGH (ref 10.3–14.5)
SODIUM SERPL-SCNC: 142 MMOL/L — SIGNIFICANT CHANGE UP (ref 135–145)
SPECIMEN SOURCE: SIGNIFICANT CHANGE UP
SPECIMEN SOURCE: SIGNIFICANT CHANGE UP
TSH SERPL-MCNC: 3.61 UIU/ML — SIGNIFICANT CHANGE UP (ref 0.36–3.74)
VIT B12 SERPL-MCNC: 1080 PG/ML — SIGNIFICANT CHANGE UP (ref 232–1245)
WBC # BLD: 6.45 K/UL — SIGNIFICANT CHANGE UP (ref 3.8–10.5)
WBC # FLD AUTO: 6.45 K/UL — SIGNIFICANT CHANGE UP (ref 3.8–10.5)

## 2022-03-08 RX ORDER — CHOLESTYRAMINE 4 G/9G
4 POWDER, FOR SUSPENSION ORAL
Refills: 0 | Status: DISCONTINUED | OUTPATIENT
Start: 2022-03-08 | End: 2022-03-14

## 2022-03-08 RX ORDER — FUROSEMIDE 40 MG
40 TABLET ORAL ONCE
Refills: 0 | Status: COMPLETED | OUTPATIENT
Start: 2022-03-08 | End: 2022-03-08

## 2022-03-08 RX ADMIN — Medication 40 MILLIGRAM(S): at 14:45

## 2022-03-08 RX ADMIN — ONDANSETRON 4 MILLIGRAM(S): 8 TABLET, FILM COATED ORAL at 16:09

## 2022-03-08 RX ADMIN — Medication 100 MILLIGRAM(S): at 05:22

## 2022-03-08 RX ADMIN — HEPARIN SODIUM 5000 UNIT(S): 5000 INJECTION INTRAVENOUS; SUBCUTANEOUS at 18:04

## 2022-03-08 RX ADMIN — CEFTRIAXONE 100 MILLIGRAM(S): 500 INJECTION, POWDER, FOR SOLUTION INTRAMUSCULAR; INTRAVENOUS at 12:04

## 2022-03-08 RX ADMIN — Medication 650 MILLIGRAM(S): at 16:11

## 2022-03-08 RX ADMIN — CHOLESTYRAMINE 4 GRAM(S): 4 POWDER, FOR SUSPENSION ORAL at 21:19

## 2022-03-08 RX ADMIN — Medication 650 MILLIGRAM(S): at 16:41

## 2022-03-08 RX ADMIN — CHOLESTYRAMINE 4 GRAM(S): 4 POWDER, FOR SUSPENSION ORAL at 09:53

## 2022-03-08 RX ADMIN — HEPARIN SODIUM 5000 UNIT(S): 5000 INJECTION INTRAVENOUS; SUBCUTANEOUS at 05:22

## 2022-03-08 NOTE — PROGRESS NOTE ADULT - ASSESSMENT
HPI:  87yo, WF with h/o  HLD, Depression, HTN, Lasix for LE edema [facility]  [facility], vomiting  and diarrhea x 3 days. non-bloody diarrhea x 3 episodes per pt, vomiting and persistent nausea - no noted previous abdominal surgeries or scars on examination -non-distended - passing gas. Patient denies sick contacts.  Denies CP, SOB, PND, cough, palpitation, chills, weakness, abdominal pain, dysuria ,HA, dizziness.    In ER,  rectal temp of 103 F, low grade tachycardia 103 Sinus tachycardia on EKG.     (06 Mar 2022 13:59)  ----- As Above -------- Patient is a fair historian. Could not contact family  Patient presented with N/V, abdominal pain and diarrhea x 3 days. Febrile (+)Non bloody. No one else with same symptoms. No history of IBD.   Prior to these symptoms, the patient denies melena, hematochezia, hematemesis, nausea, vomiting, abdominal pain, constipation, diarrhea, or change in bowel movements   Colonoscopy (?)       A) N/V, abdominal pain, diarrhea, fever, abnormal CT scan - c/w Gastroenteritis - Patient doing much better this morning but still having significant diarrhea while on full liquid diet. 1) clear liquid diet 2) CRP 3) Otherwise, supportive care 4) No need for antibiotics from GI point of view 5) Cholestyramine   B) Anemia - Acute drop probably from rehydration - Today's are stable  Blood work ordered.   ==== Will again attempt to speak with family HPI:  87yo, WF with h/o  HLD, Depression, HTN, Lasix for LE edema BIBEMS from residential  for nausea, vomiting  and diarrhea x 3 days. non-bloody diarrhea x 3 episodes per pt, vomiting and persistent nausea - no noted previous abdominal surgeries or scars on examination -non-distended - passing gas. Patient denies sick contacts.  Denies CP, SOB, PND, cough, palpitation, chills, weakness, abdominal pain, dysuria ,HA, dizziness.    In ER,  rectal temp of 103 F, low grade tachycardia 103 Sinus tachycardia on EKG.     (06 Mar 2022 13:59)  ----- As Above -------- Patient is a fair historian. Could not contact family  Patient presented with N/V, abdominal pain and diarrhea x 3 days. Febrile (+)Non bloody. No one else with same symptoms. No history of IBD.   Prior to these symptoms, the patient denies melena, hematochezia, hematemesis, nausea, vomiting, abdominal pain, constipation, diarrhea, or change in bowel movements   Colonoscopy (?)       A) N/V, abdominal pain, diarrhea, fever, abnormal CT scan - c/w Gastroenteritis - Patient doing much better this morning but still having significant diarrhea while on full liquid diet. CRP 41 1) clear liquid diet 2) F/U CRP 3) Otherwise, supportive care 4) No need for antibiotics from GI point of view 5) Cholestyramine   B) Anemia - Acute drop probably from rehydration - Today's are stable  Blood work seen  ==== Will again attempt to speak with family

## 2022-03-08 NOTE — CONSULT NOTE ADULT - ASSESSMENT
87yo, WF with h/o  HLD, Depression, HTN, Lasix for LE edema BIBEMS from senior living  for nausea, vomiting  and diarrhea x 3 days. non-bloody diarrhea x 3 episodes per pt, vomiting and persistent nausea -  today all resolved   but patient is not really oriented; from [facility] but said she lives with her son and daughter in law .    In ER,  rectal temp of 103 F, low grade tachycardia 103 Sinus tachycardia on EKG.; none now x 36 hours   work up is consistent with  Gastroenteritis /thickenned bladder  Sn[facility] and AF are experiencing viral gastroenteritis   Now Patient doing much better . Tolerating clear liquids    resolved fevers   no urinary signs and symptoms   abdominal signs and symptoms are resolved at this point   drop in hb/hct likely hydration  sudden jump in procal despite improvement of dehydration with fluids   no fevers x 36 hours   ua consistent with urinary tract infection   likely dc antibiotics am # days adequate for urinary tract infection   i called the number of son listed on addressess ; that number is not in service

## 2022-03-08 NOTE — CONSULT NOTE ADULT - SUBJECTIVE AND OBJECTIVE BOX
HPI:  85yo, WF with h/o  HLD, Depression, HTN, Lasix for LE edema BIBEMS from group home  for nausea, vomiting  and diarrhea x 3 days. non-bloody diarrhea x 3 episodes per pt, vomiting and persistent nausea - no noted previous abdominal surgeries or scars on examination -non-distended - passing gas. Patient denies sick contacts.  Denies CP, SOB, PND, cough, palpitation, chills, weakness, abdominal pain, dysuria ,HA, dizziness.    In ER,  rectal temp of 103 F, low grade tachycardia 103 Sinus tachycardia on EKG.     (06 Mar 2022 13:59)  now feels much better   denies fever    PAST MEDICAL & SURGICAL HISTORY:      SOCHX:  ex  tobacco,   30 years ago    -no  alcohol    FMHX: FA/MO  - non contributory   lives with son and daughter ?? per patient but demografphics is from assisted living       Recent Travel:    Immunizations:    Allergies    No Known Allergies    Intolerances        MEDICATIONS  (STANDING):  cefTRIAXone   IVPB      cefTRIAXone   IVPB 1000 milliGRAM(s) IV Intermittent every 24 hours  cholestyramine Powder (Sugar-Free) 4 Gram(s) Oral two times a day  furosemide   Injectable 40 milliGRAM(s) IV Push once  heparin   Injectable 5000 Unit(s) SubCutaneous every 12 hours    MEDICATIONS  (PRN):  acetaminophen     Tablet .. 650 milliGRAM(s) Oral every 6 hours PRN Temp greater or equal to 38C (100.4F), Mild Pain (1 - 3)  aluminum hydroxide/magnesium hydroxide/simethicone Suspension 30 milliLiter(s) Oral every 4 hours PRN Dyspepsia  melatonin 3 milliGRAM(s) Oral at bedtime PRN Insomnia  ondansetron Injectable 4 milliGRAM(s) IV Push every 8 hours PRN Nausea and/or Vomiting      REVIEW OF SYSTEMS:  CONSTITUTIONAL: No fever, weight loss, or fatigue  EYES: No eye pain, visual disturbances, or discharge  ENMT:  No difficulty hearing, tinnitus, vertigo; No sinus or throat pain  NECK: No pain or stiffness  BREASTS: No pain, masses, or nipple discharge  RESPIRATORY: No cough, wheezing, chills or hemoptysis; No shortness of breath  CARDIOVASCULAR: No chest pain, palpitations, dizziness, or leg swelling  GASTROINTESTINAL: No abdominal or epigastric pain. No nausea, vomiting, or hematemesis;   no more diarrhea  GENITOURINARY: No dysuria, frequency, hematuria, or incontinence  NEUROLOGICAL: No headaches, memory loss, loss of strength, numbness, or tremors  SKIN: No itching, burning, rashes, or lesions   LYMPH NODES: No enlarged glands  ENDOCRINE: No heat or cold intolerance; No hair loss  MUSCULOSKELETAL: No joint pain or swelling; No muscle, back, or extremity pain  PSYCHIATRIC: No depression, anxiety, mood swings, or difficulty sleeping  HEME/LYMPH: No easy bruising, or bleeding gums  ALLERGY AND IMMUNOLOGIC: No hives or eczema    VITAL SIGNS:    T(C): 36.7 (03-08-22 @ 11:21), Max: 37.2 (03-07-22 @ 16:55)  T(F): 98.1 (03-08-22 @ 11:21), Max: 98.9 (03-07-22 @ 16:55)  HR: 66 (03-08-22 @ 11:21) (66 - 71)  BP: 144/71 (03-08-22 @ 11:21) (132/73 - 144/80)  RR: 16 (03-08-22 @ 11:21) (16 - 20)  SpO2: 96% (03-08-22 @ 11:21) (95% - 99%)    PHYSICAL EXAM:    GENERAL: NAD, well-groomed, well-developed  HEAD:  Atraumatic, Normocephalic  EYES: EOMI, PERRLA, conjunctiva and sclera clear  ENMT: No tonsillar erythema, exudates, or enlargement; Moist mucous membranes,  NECK: Supple, No JVD, Normal thyroid  NERVOUS SYSTEM:  Alert & Oriented X3, Good concentration; very slow responses   motor function not assessed barely moveing says she takes her own showers etc   CHEST/LUNG: Clear to auscultation bilaterally; No rales, rhonchi, wheezing bilaterally  HEART: Regular rate and rhythm; No murmurs, rubs, or gallops  ABDOMEN: Soft, Nontender, Nondistended; Bowel sounds present  EXTREMITIES:  2+ Peripheral Pulses, No clubbing, cyanosis, or edema  LYMPH: No lymphadenopathy noted  SKIN: No rashes or lesions  BACK: no pressor sore     LABS:                         10.4   6.45  )-----------( 154      ( 08 Mar 2022 06:36 )             34.0     03-08    142  |  113<H>  |  11  ----------------------------<  97  3.5   |  25  |  0.66    Ca    7.9<L>      08 Mar 2022 06:36    TPro  6.3  /  Alb  2.3<L>  /  TBili  0.3  /  DBili  x   /  AST  25  /  ALT  15  /  AlkPhos  71  03-07    LIVER FUNCTIONS - ( 07 Mar 2022 06:26 )  Alb: 2.3 g/dL / Pro: 6.3 gm/dL / ALK PHOS: 71 U/L / ALT: 15 U/L / AST: 25 U/L / GGT: x                     Thyroid Stimulating Hormone, Serum: 3.610 uIU/mL (03-08 @ 06:36)                Culture Results:   Culture grew 3 or more types of organisms which indicate  collection contamination; consider recollection only if clinically  indicated. (03-06 @ 13:03)  Culture Results:   No growth to date. (03-06 @ 12:13)  Culture Results:   No growth to date. (03-06 @ 12:13)                Radiology:      < from: CT Abdomen and Pelvis w/ IV Cont (03.06.22 @ 10:22) >    IMPRESSION:  Few mildly thickened and dilated loops of small bowel in the upper to mid   abdomen suggesting enteritis. No transition zone to suggest bowel   obstruction.    Thickened bladder wall. Correlation with urinalysis recommended.    Fat herniation through the esophageal hiatus into the middle mediastinum.    1.7 cm cystic lesion pancreatic tail.Correlation with contrast enhanced   abdominal MRI recommended as an outpatient.    --- End of Report ---          < end of copied text >  < from: CT Abdomen and Pelvis w/ IV Cont (03.06.22 @ 10:22) >

## 2022-03-09 LAB
ANION GAP SERPL CALC-SCNC: 6 MMOL/L — SIGNIFICANT CHANGE UP (ref 5–17)
BUN SERPL-MCNC: 10 MG/DL — SIGNIFICANT CHANGE UP (ref 7–23)
CALCIUM SERPL-MCNC: 8.3 MG/DL — LOW (ref 8.5–10.1)
CHLORIDE SERPL-SCNC: 113 MMOL/L — HIGH (ref 96–108)
CO2 SERPL-SCNC: 25 MMOL/L — SIGNIFICANT CHANGE UP (ref 22–31)
CREAT SERPL-MCNC: 0.66 MG/DL — SIGNIFICANT CHANGE UP (ref 0.5–1.3)
EGFR: 86 ML/MIN/1.73M2 — SIGNIFICANT CHANGE UP
GLUCOSE SERPL-MCNC: 95 MG/DL — SIGNIFICANT CHANGE UP (ref 70–99)
HCT VFR BLD CALC: 34 % — LOW (ref 34.5–45)
HGB BLD-MCNC: 10.8 G/DL — LOW (ref 11.5–15.5)
MCHC RBC-ENTMCNC: 28.9 PG — SIGNIFICANT CHANGE UP (ref 27–34)
MCHC RBC-ENTMCNC: 31.8 G/DL — LOW (ref 32–36)
MCV RBC AUTO: 90.9 FL — SIGNIFICANT CHANGE UP (ref 80–100)
NRBC # BLD: 0 /100 WBCS — SIGNIFICANT CHANGE UP (ref 0–0)
PLATELET # BLD AUTO: 172 K/UL — SIGNIFICANT CHANGE UP (ref 150–400)
POTASSIUM SERPL-MCNC: 3.3 MMOL/L — LOW (ref 3.5–5.3)
POTASSIUM SERPL-SCNC: 3.3 MMOL/L — LOW (ref 3.5–5.3)
PROCALCITONIN SERPL-MCNC: 1.1 NG/ML — HIGH (ref 0.02–0.1)
RBC # BLD: 3.74 M/UL — LOW (ref 3.8–5.2)
RBC # FLD: 14.8 % — HIGH (ref 10.3–14.5)
SODIUM SERPL-SCNC: 144 MMOL/L — SIGNIFICANT CHANGE UP (ref 135–145)
WBC # BLD: 7 K/UL — SIGNIFICANT CHANGE UP (ref 3.8–10.5)
WBC # FLD AUTO: 7 K/UL — SIGNIFICANT CHANGE UP (ref 3.8–10.5)

## 2022-03-09 RX ORDER — AMLODIPINE BESYLATE 2.5 MG/1
10 TABLET ORAL DAILY
Refills: 0 | Status: DISCONTINUED | OUTPATIENT
Start: 2022-03-09 | End: 2022-03-15

## 2022-03-09 RX ORDER — POTASSIUM CHLORIDE 20 MEQ
40 PACKET (EA) ORAL EVERY 4 HOURS
Refills: 0 | Status: COMPLETED | OUTPATIENT
Start: 2022-03-09 | End: 2022-03-09

## 2022-03-09 RX ADMIN — HEPARIN SODIUM 5000 UNIT(S): 5000 INJECTION INTRAVENOUS; SUBCUTANEOUS at 18:06

## 2022-03-09 RX ADMIN — CHOLESTYRAMINE 4 GRAM(S): 4 POWDER, FOR SUSPENSION ORAL at 21:36

## 2022-03-09 RX ADMIN — Medication 40 MILLIEQUIVALENT(S): at 21:36

## 2022-03-09 RX ADMIN — HEPARIN SODIUM 5000 UNIT(S): 5000 INJECTION INTRAVENOUS; SUBCUTANEOUS at 05:52

## 2022-03-09 RX ADMIN — Medication 40 MILLIEQUIVALENT(S): at 18:07

## 2022-03-09 RX ADMIN — AMLODIPINE BESYLATE 10 MILLIGRAM(S): 2.5 TABLET ORAL at 18:06

## 2022-03-09 RX ADMIN — CHOLESTYRAMINE 4 GRAM(S): 4 POWDER, FOR SUSPENSION ORAL at 11:42

## 2022-03-09 NOTE — PROGRESS NOTE ADULT - ASSESSMENT
norovirus  fevers resolved  urinary tract infection      plan     complete 3 days of antibiotics for simple urinary tract infection   hydration as needed  clinically improving

## 2022-03-09 NOTE — PROGRESS NOTE ADULT - ASSESSMENT
HPI:  85yo, WF with h/o  HLD, Depression, HTN, Lasix for LE edema BIBEMS from group home  for nausea, vomiting  and diarrhea x 3 days. non-bloody diarrhea x 3 episodes per pt, vomiting and persistent nausea - no noted previous abdominal surgeries or scars on examination -non-distended - passing gas. Patient denies sick contacts.  Denies CP, SOB, PND, cough, palpitation, chills, weakness, abdominal pain, dysuria ,HA, dizziness.    In ER,  rectal temp of 103 F, low grade tachycardia 103 Sinus tachycardia on EKG.     (06 Mar 2022 13:59)  ----- As Above -------- Patient is a fair historian. Could not contact family  Patient presented with N/V, abdominal pain and diarrhea x 3 days. Febrile (+)Non bloody. No one else with same symptoms. No history of IBD.   Prior to these symptoms, the patient denies melena, hematochezia, hematemesis, nausea, vomiting, abdominal pain, constipation, diarrhea, or change in bowel movements   Colonoscopy (?)       A) N/V, abdominal pain, diarrhea, fever, abnormal CT scan - c/w Gastroenteritis - Symptoms resolved. Tolerated full liquid diet. CRP 41 1) advance to solid diet  2) F/U CRP 3) Otherwise, supportive care 4) No need for antibiotics from GI point of view 5) Cholestyramine   B) Anemia - Acute drop probably from rehydration - Today's are stable  Blood work seen

## 2022-03-09 NOTE — PHARMACOTHERAPY INTERVENTION NOTE - COMMENTS
Suspended ceftriaxone as the patient's GI PCR came back positive for Norovirus. Antibiotics will not be effective. Treatment is supportive care. ID and GI notes also mention no need for antibiotics. Awaiting response before discontinuing.

## 2022-03-10 LAB
ANION GAP SERPL CALC-SCNC: 5 MMOL/L — SIGNIFICANT CHANGE UP (ref 5–17)
BUN SERPL-MCNC: 9 MG/DL — SIGNIFICANT CHANGE UP (ref 7–23)
CALCIUM SERPL-MCNC: 9 MG/DL — SIGNIFICANT CHANGE UP (ref 8.5–10.1)
CHLORIDE SERPL-SCNC: 112 MMOL/L — HIGH (ref 96–108)
CO2 SERPL-SCNC: 24 MMOL/L — SIGNIFICANT CHANGE UP (ref 22–31)
CREAT SERPL-MCNC: 0.59 MG/DL — SIGNIFICANT CHANGE UP (ref 0.5–1.3)
CULTURE RESULTS: SIGNIFICANT CHANGE UP
EGFR: 89 ML/MIN/1.73M2 — SIGNIFICANT CHANGE UP
GLUCOSE SERPL-MCNC: 96 MG/DL — SIGNIFICANT CHANGE UP (ref 70–99)
HCT VFR BLD CALC: 35.5 % — SIGNIFICANT CHANGE UP (ref 34.5–45)
HGB BLD-MCNC: 11.4 G/DL — LOW (ref 11.5–15.5)
MCHC RBC-ENTMCNC: 28.8 PG — SIGNIFICANT CHANGE UP (ref 27–34)
MCHC RBC-ENTMCNC: 32.1 G/DL — SIGNIFICANT CHANGE UP (ref 32–36)
MCV RBC AUTO: 89.6 FL — SIGNIFICANT CHANGE UP (ref 80–100)
NRBC # BLD: 0 /100 WBCS — SIGNIFICANT CHANGE UP (ref 0–0)
PLATELET # BLD AUTO: 175 K/UL — SIGNIFICANT CHANGE UP (ref 150–400)
POTASSIUM SERPL-MCNC: 4.4 MMOL/L — SIGNIFICANT CHANGE UP (ref 3.5–5.3)
POTASSIUM SERPL-SCNC: 4.4 MMOL/L — SIGNIFICANT CHANGE UP (ref 3.5–5.3)
RBC # BLD: 3.96 M/UL — SIGNIFICANT CHANGE UP (ref 3.8–5.2)
RBC # FLD: 14.6 % — HIGH (ref 10.3–14.5)
SODIUM SERPL-SCNC: 141 MMOL/L — SIGNIFICANT CHANGE UP (ref 135–145)
SPECIMEN SOURCE: SIGNIFICANT CHANGE UP
WBC # BLD: 6.8 K/UL — SIGNIFICANT CHANGE UP (ref 3.8–10.5)
WBC # FLD AUTO: 6.8 K/UL — SIGNIFICANT CHANGE UP (ref 3.8–10.5)

## 2022-03-10 RX ADMIN — HEPARIN SODIUM 5000 UNIT(S): 5000 INJECTION INTRAVENOUS; SUBCUTANEOUS at 17:52

## 2022-03-10 RX ADMIN — CHOLESTYRAMINE 4 GRAM(S): 4 POWDER, FOR SUSPENSION ORAL at 21:10

## 2022-03-10 RX ADMIN — AMLODIPINE BESYLATE 10 MILLIGRAM(S): 2.5 TABLET ORAL at 06:28

## 2022-03-10 RX ADMIN — CHOLESTYRAMINE 4 GRAM(S): 4 POWDER, FOR SUSPENSION ORAL at 09:45

## 2022-03-10 RX ADMIN — HEPARIN SODIUM 5000 UNIT(S): 5000 INJECTION INTRAVENOUS; SUBCUTANEOUS at 06:28

## 2022-03-10 NOTE — PROGRESS NOTE ADULT - ASSESSMENT
norovirus  fevers resolved  urinary tract infection    complete 3 days of antibiotics for simple urinary tract infection   dc antibiotics today   hydration as needed  clinically improving

## 2022-03-10 NOTE — PHARMACOTHERAPY INTERVENTION NOTE - COMMENTS
Recommend discontinuing ceftriaxone since the patient is here for NVD and has confirmed Norovirus by GI PCR. Antibiotics were utilized for possible UTI (moderate pyuria, but no urinary symptoms). ID recommendation to complete 3 days. Patient has completed 3 days.

## 2022-03-10 NOTE — PHARMACOTHERAPY INTERVENTION NOTE - NSPHARMCOMMASP
ASP - De-escalation
ASP - Duration of therapy
ASP - De-escalation
ASP - Lab/ test recommended
ASP - De-escalation

## 2022-03-10 NOTE — PROGRESS NOTE ADULT - ASSESSMENT
HPI:  87yo, WF with h/o  HLD, Depression, HTN, Lasix for LE edema B[facility]EMS from [facility]  for nausea, vomiting  and diarrhea x 3 days. non-bloody diarrhea x 3 episodes per pt, vomiting and persistent nausea - no noted previous abdominal surgeries or scars on examination -non-distended - passing gas. Patient denies sick contacts.  Denies CP, SOB, PND, cough, palpitation, chills, weakness, abdominal pain, dysuria ,HA, dizziness.    In ER,  rectal temp of 103 F, low grade tachycardia 103 Sinus tachycardia on EKG.     (06 Mar 2022 13:59)  ----- As Above -------- Patient is a fair historian. Could not contact family  Patient presented with N/V, abdominal pain and diarrhea x 3 days. Febrile (+)Non bloody. No one else with same symptoms. No history of IBD.   Prior to these symptoms, the patient denies melena, hematochezia, hematemesis, nausea, vomiting, abdominal pain, constipation, diarrhea, or change in bowel movements   Colonoscopy (?)       A) N/V, abdominal pain, diarrhea, fever, abnormal CT scan - c/w Gastroenteritis - Symptoms resolved except diarrhea. C Diff negative On solid diet. Procalcitonin better but still high (1.10)  CRP 41 1) clear liquid diet  2) F/U CRP 3) Otherwise, supportive care 4) Flex sig if no improvement 5) Cholestyramine   B) Anemia - Acute drop probably from rehydration - Today's are stable  Blood work seen

## 2022-03-11 LAB
ALBUMIN SERPL ELPH-MCNC: 2.6 G/DL — LOW (ref 3.3–5)
ALP SERPL-CCNC: 73 U/L — SIGNIFICANT CHANGE UP (ref 40–120)
ALT FLD-CCNC: 23 U/L — SIGNIFICANT CHANGE UP (ref 12–78)
ANION GAP SERPL CALC-SCNC: 6 MMOL/L — SIGNIFICANT CHANGE UP (ref 5–17)
AST SERPL-CCNC: 33 U/L — SIGNIFICANT CHANGE UP (ref 15–37)
BILIRUB DIRECT SERPL-MCNC: 0.2 MG/DL — SIGNIFICANT CHANGE UP (ref 0–0.3)
BILIRUB INDIRECT FLD-MCNC: 0.3 MG/DL — SIGNIFICANT CHANGE UP (ref 0.2–1)
BILIRUB SERPL-MCNC: 0.5 MG/DL — SIGNIFICANT CHANGE UP (ref 0.2–1.2)
BUN SERPL-MCNC: 9 MG/DL — SIGNIFICANT CHANGE UP (ref 7–23)
CALCIUM SERPL-MCNC: 9.1 MG/DL — SIGNIFICANT CHANGE UP (ref 8.5–10.1)
CHLORIDE SERPL-SCNC: 108 MMOL/L — SIGNIFICANT CHANGE UP (ref 96–108)
CO2 SERPL-SCNC: 25 MMOL/L — SIGNIFICANT CHANGE UP (ref 22–31)
CREAT SERPL-MCNC: 0.73 MG/DL — SIGNIFICANT CHANGE UP (ref 0.5–1.3)
CULTURE RESULTS: SIGNIFICANT CHANGE UP
EGFR: 81 ML/MIN/1.73M2 — SIGNIFICANT CHANGE UP
GLUCOSE SERPL-MCNC: 101 MG/DL — HIGH (ref 70–99)
HCT VFR BLD CALC: 35.2 % — SIGNIFICANT CHANGE UP (ref 34.5–45)
HGB BLD-MCNC: 11.2 G/DL — LOW (ref 11.5–15.5)
MCHC RBC-ENTMCNC: 28.8 PG — SIGNIFICANT CHANGE UP (ref 27–34)
MCHC RBC-ENTMCNC: 31.8 G/DL — LOW (ref 32–36)
MCV RBC AUTO: 90.5 FL — SIGNIFICANT CHANGE UP (ref 80–100)
NRBC # BLD: 0 /100 WBCS — SIGNIFICANT CHANGE UP (ref 0–0)
PLATELET # BLD AUTO: 193 K/UL — SIGNIFICANT CHANGE UP (ref 150–400)
POTASSIUM SERPL-MCNC: 4.3 MMOL/L — SIGNIFICANT CHANGE UP (ref 3.5–5.3)
POTASSIUM SERPL-SCNC: 4.3 MMOL/L — SIGNIFICANT CHANGE UP (ref 3.5–5.3)
PROCALCITONIN SERPL-MCNC: 0.46 NG/ML — HIGH (ref 0.02–0.1)
PROT SERPL-MCNC: 7.1 GM/DL — SIGNIFICANT CHANGE UP (ref 6–8.3)
RBC # BLD: 3.89 M/UL — SIGNIFICANT CHANGE UP (ref 3.8–5.2)
RBC # FLD: 14.7 % — HIGH (ref 10.3–14.5)
SODIUM SERPL-SCNC: 139 MMOL/L — SIGNIFICANT CHANGE UP (ref 135–145)
SPECIMEN SOURCE: SIGNIFICANT CHANGE UP
WBC # BLD: 7.16 K/UL — SIGNIFICANT CHANGE UP (ref 3.8–10.5)
WBC # FLD AUTO: 7.16 K/UL — SIGNIFICANT CHANGE UP (ref 3.8–10.5)

## 2022-03-11 RX ADMIN — CHOLESTYRAMINE 4 GRAM(S): 4 POWDER, FOR SUSPENSION ORAL at 21:44

## 2022-03-11 RX ADMIN — HEPARIN SODIUM 5000 UNIT(S): 5000 INJECTION INTRAVENOUS; SUBCUTANEOUS at 06:03

## 2022-03-11 RX ADMIN — CHOLESTYRAMINE 4 GRAM(S): 4 POWDER, FOR SUSPENSION ORAL at 09:30

## 2022-03-11 RX ADMIN — HEPARIN SODIUM 5000 UNIT(S): 5000 INJECTION INTRAVENOUS; SUBCUTANEOUS at 17:45

## 2022-03-11 RX ADMIN — AMLODIPINE BESYLATE 10 MILLIGRAM(S): 2.5 TABLET ORAL at 06:03

## 2022-03-11 NOTE — PROGRESS NOTE ADULT - ASSESSMENT
HPI:  87yo, WF with h/o  HLD, Depression, HTN, Lasix for LE edema BIBEMS from [facility]  for nausea, vomiting  and diarrhea x 3 days. non-bloody diarrhea x 3 episodes per pt, vomiting and persistent nausea - no noted previous abdominal surgeries or scars on examination -non-distended - passing gas. Patient denies sick contacts.  Denies CP, SOB, PND, cough, palpitation, chills, weakness, abdominal pain, dysuria ,HA, dizziness.    In ER,  rectal temp of 103 F, low grade tachycardia 103 Sinus tachycardia on EKG.     (06 Mar 2022 13:59)  ----- As Above -------- Patient is a fair historian. Could not contact family  Patient presented with N/V, abdominal pain and diarrhea x 3 days. Febrile (+)Non bloody. No one else with same symptoms. No history of IBD.   Prior to these symptoms, the patient denies melena, hematochezia, hematemesis, nausea, vomiting, abdominal pain, constipation, diarrhea, or change in bowel movements   Colonoscopy (?)       A) N/V, abdominal pain, diarrhea, fever, abnormal CT scan - c/w Norovirus !! - Symptoms resolved  C Diff negative On solid diet.   CRP 41 1) continue solid diet 2) F/U CRP 3) Otherwise, supportive care 4) Cholestyramine   B) Anemia - Acute drop probably from rehydration - Today's are stable  Blood work seen

## 2022-03-11 NOTE — PROGRESS NOTE ADULT - ASSESSMENT
norovirus  fevers resolved  urinary tract infection  sp antibiotics   hydration as needed  clinically improving   discharge plan once diarrhea is resistanceolved supportive care

## 2022-03-12 LAB
ANION GAP SERPL CALC-SCNC: 4 MMOL/L — LOW (ref 5–17)
BUN SERPL-MCNC: 10 MG/DL — SIGNIFICANT CHANGE UP (ref 7–23)
CALCIUM SERPL-MCNC: 8.6 MG/DL — SIGNIFICANT CHANGE UP (ref 8.5–10.1)
CHLORIDE SERPL-SCNC: 109 MMOL/L — HIGH (ref 96–108)
CO2 SERPL-SCNC: 24 MMOL/L — SIGNIFICANT CHANGE UP (ref 22–31)
CREAT SERPL-MCNC: 0.67 MG/DL — SIGNIFICANT CHANGE UP (ref 0.5–1.3)
EGFR: 86 ML/MIN/1.73M2 — SIGNIFICANT CHANGE UP
GLUCOSE SERPL-MCNC: 110 MG/DL — HIGH (ref 70–99)
HCT VFR BLD CALC: 33.7 % — LOW (ref 34.5–45)
HGB BLD-MCNC: 11.2 G/DL — LOW (ref 11.5–15.5)
MCHC RBC-ENTMCNC: 29.1 PG — SIGNIFICANT CHANGE UP (ref 27–34)
MCHC RBC-ENTMCNC: 33.2 G/DL — SIGNIFICANT CHANGE UP (ref 32–36)
MCV RBC AUTO: 87.5 FL — SIGNIFICANT CHANGE UP (ref 80–100)
NRBC # BLD: 0 /100 WBCS — SIGNIFICANT CHANGE UP (ref 0–0)
PLATELET # BLD AUTO: 207 K/UL — SIGNIFICANT CHANGE UP (ref 150–400)
POTASSIUM SERPL-MCNC: 4.1 MMOL/L — SIGNIFICANT CHANGE UP (ref 3.5–5.3)
POTASSIUM SERPL-SCNC: 4.1 MMOL/L — SIGNIFICANT CHANGE UP (ref 3.5–5.3)
RBC # BLD: 3.85 M/UL — SIGNIFICANT CHANGE UP (ref 3.8–5.2)
RBC # FLD: 14.7 % — HIGH (ref 10.3–14.5)
SODIUM SERPL-SCNC: 137 MMOL/L — SIGNIFICANT CHANGE UP (ref 135–145)
WBC # BLD: 9.14 K/UL — SIGNIFICANT CHANGE UP (ref 3.8–10.5)
WBC # FLD AUTO: 9.14 K/UL — SIGNIFICANT CHANGE UP (ref 3.8–10.5)

## 2022-03-12 RX ORDER — LOPERAMIDE HCL 2 MG
2 TABLET ORAL ONCE
Refills: 0 | Status: COMPLETED | OUTPATIENT
Start: 2022-03-12 | End: 2022-03-12

## 2022-03-12 RX ADMIN — CHOLESTYRAMINE 4 GRAM(S): 4 POWDER, FOR SUSPENSION ORAL at 08:46

## 2022-03-12 RX ADMIN — AMLODIPINE BESYLATE 10 MILLIGRAM(S): 2.5 TABLET ORAL at 05:42

## 2022-03-12 RX ADMIN — HEPARIN SODIUM 5000 UNIT(S): 5000 INJECTION INTRAVENOUS; SUBCUTANEOUS at 05:43

## 2022-03-12 RX ADMIN — CHOLESTYRAMINE 4 GRAM(S): 4 POWDER, FOR SUSPENSION ORAL at 21:08

## 2022-03-12 RX ADMIN — HEPARIN SODIUM 5000 UNIT(S): 5000 INJECTION INTRAVENOUS; SUBCUTANEOUS at 17:15

## 2022-03-12 RX ADMIN — Medication 2 MILLIGRAM(S): at 22:01

## 2022-03-12 NOTE — PROGRESS NOTE ADULT - ASSESSMENT
HPI:  87yo, WF with h/o  HLD, Depression, HTN, Lasix for LE edema BIBEMS from group home  for nausea, vomiting  and diarrhea x 3 days. non-bloody diarrhea x 3 episodes per pt, vomiting and persistent nausea - no noted previous abdominal surgeries or scars on examination -non-distended - passing gas. Patient denies sick contacts.  Denies CP, SOB, PND, cough, palpitation, chills, weakness, abdominal pain, dysuria ,HA, dizziness.    In ER,  rectal temp of 103 F, low grade tachycardia 103 Sinus tachycardia on EKG.     (06 Mar 2022 13:59)  ----- As Above -------- Patient is a fair historian. Could not contact family  Patient presented with N/V, abdominal pain and diarrhea x 3 days. Febrile (+)Non bloody. No one else with same symptoms. No history of IBD.   Prior to these symptoms, the patient denies melena, hematochezia, hematemesis, nausea, vomiting, abdominal pain, constipation, diarrhea, or change in bowel movements   Colonoscopy (?)       A) N/V, abdominal pain, diarrhea, fever, abnormal CT scan - c/w Norovirus !! - Symptoms almost completely resolved  C Diff negative On solid diet.   CRP 41 1) continue solid diet 2) F/U CRP 3) Otherwise, supportive care 4) Cholestyramine 5) Imodium x 1  B) Anemia - Acute drop probably from rehydration - Today's are stable  Blood work seen

## 2022-03-13 LAB
ANION GAP SERPL CALC-SCNC: 5 MMOL/L — SIGNIFICANT CHANGE UP (ref 5–17)
BUN SERPL-MCNC: 11 MG/DL — SIGNIFICANT CHANGE UP (ref 7–23)
CALCIUM SERPL-MCNC: 8.6 MG/DL — SIGNIFICANT CHANGE UP (ref 8.5–10.1)
CHLORIDE SERPL-SCNC: 110 MMOL/L — HIGH (ref 96–108)
CO2 SERPL-SCNC: 24 MMOL/L — SIGNIFICANT CHANGE UP (ref 22–31)
CREAT SERPL-MCNC: 0.62 MG/DL — SIGNIFICANT CHANGE UP (ref 0.5–1.3)
EGFR: 88 ML/MIN/1.73M2 — SIGNIFICANT CHANGE UP
FLUAV AG NPH QL: SIGNIFICANT CHANGE UP
FLUBV AG NPH QL: SIGNIFICANT CHANGE UP
GLUCOSE SERPL-MCNC: 99 MG/DL — SIGNIFICANT CHANGE UP (ref 70–99)
HCT VFR BLD CALC: 33.9 % — LOW (ref 34.5–45)
HGB BLD-MCNC: 10.9 G/DL — LOW (ref 11.5–15.5)
MCHC RBC-ENTMCNC: 28.6 PG — SIGNIFICANT CHANGE UP (ref 27–34)
MCHC RBC-ENTMCNC: 32.2 G/DL — SIGNIFICANT CHANGE UP (ref 32–36)
MCV RBC AUTO: 89 FL — SIGNIFICANT CHANGE UP (ref 80–100)
NRBC # BLD: 0 /100 WBCS — SIGNIFICANT CHANGE UP (ref 0–0)
PLATELET # BLD AUTO: 235 K/UL — SIGNIFICANT CHANGE UP (ref 150–400)
POTASSIUM SERPL-MCNC: 4 MMOL/L — SIGNIFICANT CHANGE UP (ref 3.5–5.3)
POTASSIUM SERPL-SCNC: 4 MMOL/L — SIGNIFICANT CHANGE UP (ref 3.5–5.3)
RBC # BLD: 3.81 M/UL — SIGNIFICANT CHANGE UP (ref 3.8–5.2)
RBC # FLD: 15 % — HIGH (ref 10.3–14.5)
SARS-COV-2 RNA SPEC QL NAA+PROBE: SIGNIFICANT CHANGE UP
SODIUM SERPL-SCNC: 139 MMOL/L — SIGNIFICANT CHANGE UP (ref 135–145)
WBC # BLD: 9.51 K/UL — SIGNIFICANT CHANGE UP (ref 3.8–10.5)
WBC # FLD AUTO: 9.51 K/UL — SIGNIFICANT CHANGE UP (ref 3.8–10.5)

## 2022-03-13 RX ADMIN — AMLODIPINE BESYLATE 10 MILLIGRAM(S): 2.5 TABLET ORAL at 05:28

## 2022-03-13 RX ADMIN — CHOLESTYRAMINE 4 GRAM(S): 4 POWDER, FOR SUSPENSION ORAL at 09:01

## 2022-03-13 RX ADMIN — HEPARIN SODIUM 5000 UNIT(S): 5000 INJECTION INTRAVENOUS; SUBCUTANEOUS at 05:28

## 2022-03-13 RX ADMIN — CHOLESTYRAMINE 4 GRAM(S): 4 POWDER, FOR SUSPENSION ORAL at 21:32

## 2022-03-13 RX ADMIN — HEPARIN SODIUM 5000 UNIT(S): 5000 INJECTION INTRAVENOUS; SUBCUTANEOUS at 17:40

## 2022-03-13 NOTE — DIETITIAN INITIAL EVALUATION ADULT. - OTHER INFO
Problem diagnosis include norovirus, w/o N/V @ present, no diarrhea noted at present, d/c plans noted.

## 2022-03-13 NOTE — DIETITIAN INITIAL EVALUATION ADULT. - PERTINENT LABORATORY DATA
03-13 Na139 mmol/L Glu 99 mg/dL K+ 4.0 mmol/L Cr  0.62 mg/dL BUN 11 mg/dL 03-11 Alb 2.6 g/dL<L>03-11 ALT 23 U/L AST 33 U/L Alkaline Phosphatase 73 U/L

## 2022-03-13 NOTE — DIETITIAN INITIAL EVALUATION ADULT. - ORAL INTAKE PTA/DIET HISTORY
Pt is forgetful, was unable to provide diet history, was able to state date of birth.  Pt lived in [facility] PTA.  Diet PTA: No added salt, regular consistency as per transfer form.

## 2022-03-13 NOTE — DIETITIAN INITIAL EVALUATION ADULT. - FACTORS AFF FOOD INTAKE
N/V, diarrhea x 3 days PTA noted, pt has own teeth, no problems c chewing/swallowing reported by RN/difficulty feeding self/other (specify)

## 2022-03-13 NOTE — DIETITIAN INITIAL EVALUATION ADULT. - DIET TYPE
+ Ensure Clear 8 oz 2x/day (480 summer, 16 gm pro)/low sodium/fiber/residue restricted lactose restricted + Ensure Clear 8 oz 2x/day (480 summer, 16 gm pro)/low sodium/fiber/residue restricted

## 2022-03-13 NOTE — PROGRESS NOTE ADULT - ASSESSMENT
norovirus  fevers resolved  urinary tract infection  sp antibiotics   hydration as needed  clinically improving   discharge plan  stable ID wise   supportive care only

## 2022-03-14 ENCOUNTER — TRANSCRIPTION ENCOUNTER (OUTPATIENT)
Age: 87
End: 2022-03-14

## 2022-03-14 RX ORDER — AMLODIPINE BESYLATE 2.5 MG/1
1 TABLET ORAL
Qty: 0 | Refills: 0 | DISCHARGE
Start: 2022-03-14

## 2022-03-14 RX ADMIN — CHOLESTYRAMINE 4 GRAM(S): 4 POWDER, FOR SUSPENSION ORAL at 08:47

## 2022-03-14 RX ADMIN — HEPARIN SODIUM 5000 UNIT(S): 5000 INJECTION INTRAVENOUS; SUBCUTANEOUS at 18:05

## 2022-03-14 RX ADMIN — HEPARIN SODIUM 5000 UNIT(S): 5000 INJECTION INTRAVENOUS; SUBCUTANEOUS at 06:06

## 2022-03-14 RX ADMIN — AMLODIPINE BESYLATE 10 MILLIGRAM(S): 2.5 TABLET ORAL at 06:06

## 2022-03-14 NOTE — PROGRESS NOTE ADULT - PROBLEM SELECTOR PLAN 3
IVF  Monitoring renal function  Resolved
IVF  Monitoring renal function

## 2022-03-14 NOTE — PROGRESS NOTE ADULT - ASSESSMENT
norovirus  fevers resolved  urinary tract infection      plan     completed antibiotics for simple urinary tract infection   hydration as needed  clinically improving   supportive care   diarrhea resolved  GI on the case  stable from ID standpoint   will sign off please reconsult if needed  thank you

## 2022-03-14 NOTE — PROGRESS NOTE ADULT - PROBLEM SELECTOR PLAN 2
IV Flagyl discontinued  Cultures noted    2/2 Norovirus
IV Flagyl  Cultures
IV Flagyl discontinued  Cultures noted    2/2 Norovirus
IV Flagyl discontinued  Cultures noted    2/2 Norovirus
IV Flagyl  Cultures

## 2022-03-14 NOTE — DISCHARGE NOTE PROVIDER - HOSPITAL COURSE
86 year old female with h/o  HLD, Depression, HTN, LE edema B[facility]EMS from [facility]  for nausea, vomiting  and diarrhea x 3 days. Patient notes that she had non-bloody diarrhea x 3 episodes associated with vomiting and persistent nausea. In ED patient febrile with rectal temp of 103 F, in Sinus tachycardia (103 bpm) on EKG. U/A positive for UTI, completed 3 day course of Ceftriaxone. CT A/P suggestive of gastroenteritis, started on flagly but then d/c'd. Stool positive for Norovirus. Patient admitted for UTI, Norovirus and dehydration.     Norovirus, gastroenteritis  - CT A/P w/ IV contrast: Few mildly thickened and dilated loops of small bowel in the upper to mid   abdomen suggesting enteritis. No transition zone to suggest bowel   obstruction.  Thickened bladder wall. Correlation with urinalysis recommended.  Fat herniation through the esophageal hiatus into the middle mediastinum.  1.7 cm cystic lesion pancreatic tail. Correlation with contrast enhanced   abdominal MRI recommended as an outpatient.  - GI consulted: given Cholestyramine and Imodium x 1  - ID consulted  - IV Flagyl discontinued  - diarrhea resolved  - tolerating regular diet    Dehydration   - S/P IVF  - Resolved    UTI (urinary tract infection)  - ID consulted   - S/P 3 days ceftriaxone   86 year old female with h/o  HLD, Depression, HTN, LE edema BIBEMS from [facility]  for nausea, vomiting  and diarrhea x 3 days. Patient notes that she had non-bloody diarrhea x 3 episodes associated with vomiting and persistent nausea. In ED patient febrile with rectal temp of 103 F, in Sinus tachycardia (103 bpm) on EKG. U/A positive for UTI, completed 3 day course of Ceftriaxone. CT A/P suggestive of gastroenteritis, started on flagly but then d/c'd. Stool positive for Norovirus. Patient admitted for UTI, Norovirus and dehydration.     Norovirus, gastroenteritis  - CT A/P w/ IV contrast: Few mildly thickened and dilated loops of small bowel in the upper to mid   abdomen suggesting enteritis. No transition zone to suggest bowel   obstruction.  Thickened bladder wall. Correlation with urinalysis recommended.  Fat herniation through the esophageal hiatus into the middle mediastinum.  1.7 cm cystic lesion pancreatic tail. Correlation with contrast enhanced   abdominal MRI recommended as an outpatient.  - GI consulted: given Cholestyramine and Imodium x 1  - ID consulted  - IV Flagyl discontinued  - diarrhea resolved  - tolerating regular diet    Dehydration   - S/P IVF  - Tolerate regular diet and fluids  - Resolved    UTI (urinary tract infection)  - ID consulted   - S/P 3 days ceftriaxone   86 year old female with h/o  HLD, Depression, HTN, LE edema BIBEMS from [facility]  for nausea, vomiting  and diarrhea x 3 days. Patient notes that she had non-bloody diarrhea x 3 episodes associated with vomiting and persistent nausea. In ED patient febrile with rectal temp of 103 F, in Sinus tachycardia (103 bpm) on EKG. U/A positive for UTI, completed 3 day course of Ceftriaxone. CT A/P suggestive of gastroenteritis, started on flagly but then d/c'd. Stool positive for Norovirus. Patient admitted for UTI, Norovirus and dehydration.     Norovirus, gastroenteritis  - CT A/P w/ IV contrast: Few mildly thickened and dilated loops of small bowel in the upper to mid   abdomen suggesting enteritis. No transition zone to suggest bowel   obstruction.  Thickened bladder wall. Correlation with urinalysis recommended.  Fat herniation through the esophageal hiatus into the middle mediastinum.  1.7 cm cystic lesion pancreatic tail. Correlation with contrast enhanced   abdominal MRI recommended as an outpatient.  - GI consulted: given Cholestyramine and Imodium x 1  - ID consulted  - IV Flagyl discontinued  - diarrhea resolved  - tolerating regular diet  -Since patient has not had any BM in 48 hours, cleared to return to [facility], per ID    Dehydration   - S/P IVF  - Tolerate regular diet and fluids  - Resolved    UTI (urinary tract infection)  - ID consulted   - S/P 3 days ceftriaxone   86 year old female with h/o  HLD, Depression, HTN, LE edema BIBEMS from [facility]  for nausea, vomiting  and diarrhea x 3 days. Patient notes that she had non-bloody diarrhea x 3 episodes associated with vomiting and persistent nausea. In ED patient febrile with rectal temp of 103 F, in Sinus tachycardia (103 bpm) on EKG. U/A positive for UTI, completed 3 day course of Ceftriaxone. CT A/P suggestive of gastroenteritis, started on flagly but then d/c'd. Stool positive for Norovirus. Patient admitted for UTI, Norovirus and dehydration.     Norovirus, gastroenteritis  - CT A/P w/ IV contrast: Few mildly thickened and dilated loops of small bowel in the upper to mid   abdomen suggesting enteritis. No transition zone to suggest bowel   obstruction.  Thickened bladder wall. Correlation with urinalysis recommended.  Fat herniation through the esophageal hiatus into the middle mediastinum.  1.7 cm cystic lesion pancreatic tail. Correlation with contrast enhanced   abdominal MRI recommended as an outpatient.  - GI consulted: given Cholestyramine and Imodium x 1  - ID consulted  - IV Flagyl discontinued  - diarrhea resolved  - tolerating regular diet  -Since patient has not had any BM in 48 hours, cleared to return to AL, per ID    Dehydration   - S/P IVF  - Tolerate regular diet and fluids  - Resolved    UTI (urinary tract infection)  - ID consulted   - S/P 3 days ceftriaxone    ID following;  completed antibiotics for simple urinary tract infection   hydration as needed  clinically improving   supportive care   diarrhea resolved  GI on the case  stable from ID standpoint    Tolerating diet  GI following  Discharge planning

## 2022-03-14 NOTE — DISCHARGE NOTE PROVIDER - NSDCCPCAREPLAN_GEN_ALL_CORE_FT
PRINCIPAL DISCHARGE DIAGNOSIS  Diagnosis: Enteritis  Assessment and Plan of Treatment: diarrhea resolved, tolerating regular diet      SECONDARY DISCHARGE DIAGNOSES  Diagnosis: Acute UTI  Assessment and Plan of Treatment: s/p ABX     PRINCIPAL DISCHARGE DIAGNOSIS  Diagnosis: Enteritis  Assessment and Plan of Treatment: + Norovirus  Diarrhea resolved, no BM in 48 hours   Tolerating regular diet      SECONDARY DISCHARGE DIAGNOSES  Diagnosis: Acute UTI  Assessment and Plan of Treatment: Completed course of Ceftriaxone     PRINCIPAL DISCHARGE DIAGNOSIS  Diagnosis: Enteritis  Assessment and Plan of Treatment: + Norovirus  Diarrhea resolved, no BM in 48 hours   Tolerating regular diet      SECONDARY DISCHARGE DIAGNOSES  Diagnosis: Acute UTI  Assessment and Plan of Treatment: Completed course of Ceftriaxone    Diagnosis: Dehydration  Assessment and Plan of Treatment: Resolved

## 2022-03-14 NOTE — PROGRESS NOTE ADULT - ASSESSMENT
HPI:  87yo, WF with h/o  HLD, Depression, HTN, Lasix for LE edema BIBEMS from [facility]  for nausea, vomiting  and diarrhea x 3 days. non-bloody diarrhea x 3 episodes per pt, vomiting and persistent nausea - no noted previous abdominal surgeries or scars on examination -non-distended - passing gas. Patient denies sick contacts.  Denies CP, SOB, PND, cough, palpitation, chills, weakness, abdominal pain, dysuria ,HA, dizziness.    In ER,  rectal temp of 103 F, low grade tachycardia 103 Sinus tachycardia on EKG.     (06 Mar 2022 13:59)  ----- As Above -------- Patient is a fair historian. Could not contact family  Patient presented with N/V, abdominal pain and diarrhea x 3 days. Febrile (+)Non bloody. No one else with same symptoms. No history of IBD.   Prior to these symptoms, the patient denies melena, hematochezia, hematemesis, nausea, vomiting, abdominal pain, constipation, diarrhea, or change in bowel movements   Colonoscopy (?)       A) N/V, abdominal pain, diarrhea, fever, abnormal CT scan - c/w Norovirus !! - Symptoms  completely resolved  C Diff negative On solid diet.   CRP 41 1) continue solid diet 2) F/U CRP 3) Otherwise, supportive care 4) D/C Cholestyramine  B) Anemia - Acute drop probably from rehydration - Today's are stable  Blood work seen

## 2022-03-14 NOTE — PROGRESS NOTE ADULT - PROBLEM SELECTOR PLAN 1
ID consult noted.  Procalcitonin  Blood cultures  Monitoring lab
ID consult  IV Zosyn discontinued   Procalcitonin  Blood cultures  Monitoring lab
ID consult noted.  Procalcitonin  Blood cultures  Monitoring lab
ID consult  Procalcitonin  Blood cultures  Monitoring lab
ID consult noted.  Procalcitonin  Blood cultures  Monitoring lab

## 2022-03-14 NOTE — DISCHARGE NOTE PROVIDER - CARE PROVIDER_API CALL
Aida Wood  [address], [place] [address]  Phone: [phone]  Fax: [phone]  Follow Up Time: 2 weeks

## 2022-03-14 NOTE — PROGRESS NOTE ADULT - PROBLEM SELECTOR PLAN 4
IV Abx  Urine culture

## 2022-03-14 NOTE — PROGRESS NOTE ADULT - PROBLEM/PLAN-4
DISPLAY PLAN FREE TEXT
Hatchet Flap Text: The defect edges were debeveled with a #15 scalpel blade.  Given the location of the defect, shape of the defect and the proximity to free margins a hatchet flap was deemed most appropriate.  Using a sterile surgical marker, an appropriate hatchet flap was drawn incorporating the defect and placing the expected incisions within the relaxed skin tension lines where possible.    The area thus outlined was incised deep to adipose tissue with a #15 scalpel blade.  The skin margins were undermined to an appropriate distance in all directions utilizing iris scissors.

## 2022-03-14 NOTE — DISCHARGE NOTE PROVIDER - NSDCCPGOAL_GEN_ALL_CORE_FT
To get better and follow your care plan as instructed. To get better and follow your care plan as instructed.  Please follow up with your primary care physician regarding your hospitalization in 2 weeks

## 2022-03-15 ENCOUNTER — TRANSCRIPTION ENCOUNTER (OUTPATIENT)
Age: 87
End: 2022-03-15

## 2022-03-15 VITALS
HEART RATE: 89 BPM | TEMPERATURE: 99 F | OXYGEN SATURATION: 94 % | DIASTOLIC BLOOD PRESSURE: 80 MMHG | RESPIRATION RATE: 17 BRPM | SYSTOLIC BLOOD PRESSURE: 143 MMHG

## 2022-03-15 RX ADMIN — AMLODIPINE BESYLATE 10 MILLIGRAM(S): 2.5 TABLET ORAL at 05:05

## 2022-03-15 RX ADMIN — HEPARIN SODIUM 5000 UNIT(S): 5000 INJECTION INTRAVENOUS; SUBCUTANEOUS at 17:30

## 2022-03-15 RX ADMIN — HEPARIN SODIUM 5000 UNIT(S): 5000 INJECTION INTRAVENOUS; SUBCUTANEOUS at 05:05

## 2022-03-15 NOTE — PROGRESS NOTE ADULT - TIME BILLING
ID following; norovirus  fevers resolved  urinary tract infection  sp antibiotics   hydration as needed  clinically improving   discharge plan once diarrhea is resistanceolved supportive care    Tolerating diet  GI following  Stool pcr pending  Continue current care and treatment.
Switched to IV Rocephin  Continue current care and treatment.
GI
GI consult noted; A) N/V, abdominal pain, diarrhea, fever, abnormal CT scan - c/w Gastroenteritis - Patient doing much better this afternoon. Tolerating clear liquids  1) Full liquid diet 2) CRP 3) Otherwise, supportive care 4) No need for antibiotics from GI point of view  B) Anemia - Acute drop probably from rehydration - Blood work ordered.   ==== Will again attempt to speak with family      Switched to IV Rocephin  IVF discontinued   Continue current care and treatment.
gi
ID following;  completed antibiotics for simple urinary tract infection   hydration as needed  clinically improving   supportive care   diarrhea resolved  GI on the case  stable from ID standpoint    Tolerating diet  GI following  Discharge planning  Continue current care and treatment.
GI noted; A) N/V, abdominal pain, diarrhea, fever, abnormal CT scan - c/w Gastroenteritis - Patient doing much better this morning but still having significant diarrhea while on full liquid diet. CRP 41 1) clear liquid diet 2) F/U CRP 3) Otherwise, supportive care 4) No need for antibiotics from GI point of view 5) Cholestyramine   B) Anemia - Acute drop probably from rehydration - Today's are stable  Blood work seen    ID following; plan   complete 3 days of antibiotics for simple urinary tract infection   hydration as needed  clinically improving       IVF, Abx discontinued   Advance diet tolerated  Repeat Stool cultures, must clear before discharge back to [facility].  Continue current care and treatment.
ID following; norovirus  fevers resolved  urinary tract infection  complete 3 days of antibiotics for simple urinary tract infection   dc antibiotics today   hydration as needed  clinically improving     GI following  Stool pcr pending  Continue current care and treatment.
GI noted; A) N/V, abdominal pain, diarrhea, fever, abnormal CT scan - c/w Gastroenteritis - Patient doing much better this morning but still having significant diarrhea while on full liquid diet. CRP 41 1) clear liquid diet 2) F/U CRP 3) Otherwise, supportive care 4) No need for antibiotics from GI point of view 5) Cholestyramine   B) Anemia - Acute drop probably from rehydration - Today's are stable  Blood work seen    ID following; plan     complete 3 days of antibiotics for simple urinary tract infection   hydration as needed  clinically improving         Switched to IV Rocephin  IVF discontinued   Advance diet  PT  K replacement  Continue current care and treatment.

## 2022-03-15 NOTE — PROGRESS NOTE ADULT - PROVIDER SPECIALTY LIST ADULT
Infectious Disease
Gastroenterology
Infectious Disease
Gastroenterology
Infectious Disease
Infectious Disease
Gastroenterology
Gastroenterology
Infectious Disease
Gastroenterology
Internal Medicine

## 2022-03-15 NOTE — PROGRESS NOTE ADULT - SUBJECTIVE AND OBJECTIVE BOX
HPI:  85yo, WF with h/o  HLD, Depression, HTN, Lasix for LE edema BIBEMS from group home  for nausea, vomiting  and diarrhea x 3 days. non-bloody diarrhea x 3 episodes per pt, vomiting and persistent nausea - no noted previous abdominal surgeries or scars on examination -non-distended - passing gas. Patient denies sick contacts.  Denies CP, SOB, PND, cough, palpitation, chills, weakness, abdominal pain, dysuria ,HA, dizziness.    In ER,  rectal temp of 103 F, low grade tachycardia 103 Sinus tachycardia on EKG.     (06 Mar 2022 13:59)  has no complaints    Allergies    No Known Allergies    Intolerances        MEDICATIONS  (STANDING):  amLODIPine   Tablet 10 milliGRAM(s) Oral daily  cholestyramine Powder (Sugar-Free) 4 Gram(s) Oral two times a day  heparin   Injectable 5000 Unit(s) SubCutaneous every 12 hours    MEDICATIONS  (PRN):  acetaminophen     Tablet .. 650 milliGRAM(s) Oral every 6 hours PRN Temp greater or equal to 38C (100.4F), Mild Pain (1 - 3)  melatonin 3 milliGRAM(s) Oral at bedtime PRN Insomnia  ondansetron Injectable 4 milliGRAM(s) IV Push every 8 hours PRN Nausea and/or Vomiting      REVIEW OF SYSTEMS:    unable to assess   doesnt know if she has diarrhea or not  discussed with RN   no bm today so far   VITAL SIGNS:  T(C): 36.8 (03-13-22 @ 11:30), Max: 37 (03-13-22 @ 00:05)  T(F): 98.3 (03-13-22 @ 11:30), Max: 98.6 (03-13-22 @ 00:05)  HR: 83 (03-13-22 @ 11:30) (83 - 88)  BP: 143/78 (03-13-22 @ 11:30) (143/78 - 161/81)  RR: 18 (03-13-22 @ 11:30) (17 - 18)  SpO2: 93% (03-13-22 @ 11:30) (93% - 94%)  Wt(kg): --    PHYSICAL EXAM:    GENERAL: not in any distress  HEENT: Neck is supple, normocephalic, atraumatic   CHEST/LUNG: Clear to auscultation bilaterally; No rales, rhonchi, wheezing  HEART: Regular rate and rhythm; No murmurs, rubs, or gallops  ABDOMEN: Soft, Nontender, Nondistended; Bowel sounds present, no rebound   EXTREMITIES:  2+ Peripheral Pulses, No clubbing, cyanosis, or edema  SKIN: No rashes or lesions  BACK: no pressor sore   NERVOUS SYSTEM:  Alert & Oriented X2  PSYCH: confused    LABS:                         10.9   9.51  )-----------( 235      ( 13 Mar 2022 06:50 )             33.9     03-13    139  |  110<H>  |  11  ----------------------------<  99  4.0   |  24  |  0.62    Ca    8.6      13 Mar 2022 06:50                              C Diff by PCR Result: NotDetec (03-08 @ 15:25)    Culture Results:   Norovirus GI/GII  DETECTED by PCR  *******Please Note:*******  GI panel PCR evaluates for:  Campylobacter, Plesiomonas shigelloides, Salmonella,  Vibrio, Yersinia enterocolitica, Enteroaggregative  Escherichia coli (EAEC), Enteropathogenic E.coli (EPEC),  Enterotoxigenic E. coli (ETEC) lt/st, Shiga-like  toxin-producing E. coli (STEC) stx1/stx2,  Shigella/ Enteroinvasive E. coli (EIEC), Cryptosporidium,  Cyclospora cayetanensis, Entamoeba histolytica,  Giardia lamblia, Adenovirus F 40/41, Astrovirus,  Norovirus GI/GII, Rotavirus A, Sapovirus (03-08 @ 14:50)  Culture Results:   No enteric pathogens isolated.  (Stool culture examined for Salmonella,  Shigella, Campylobacter, Aeromonas, Plesiomonas,  Vibrio, E.coli O157 and Yersinia) (03-08 @ 14:50)  Culture Results:   No Protozoa seen by trichrome stain  No Helminths or Protozoa seen in formalin concentrate  performed by iodine stain  (routine O+P not evaluated for Microsporidia,  Cryptosporidia, Cyclospora, or Isospora.)  One negative sample does not necessarily rule  out the presence of a parasitic infection.  Moderate Yeast like cells seen (03-08 @ 14:49)  Culture Results:   <10,000 CFU/mL Normal Urogenital Radha (03-08 @ 00:34)                Radiology:      
HPI:  85yo, WF with h/o  HLD, Depression, HTN, Lasix for LE edema BIBEMS from longterm  for nausea, vomiting  and diarrhea x 3 days. non-bloody diarrhea x 3 episodes per pt, vomiting and persistent nausea - no noted previous abdominal surgeries or scars on examination -non-distended - passing gas. Patient denies sick contacts.  Denies CP, SOB, PND, cough, palpitation, chills, weakness, abdominal pain, dysuria ,HA, dizziness.    In ER,  rectal temp of 103 F, low grade tachycardia 103 Sinus tachycardia on EKG.     (06 Mar 2022 13:59)      Allergies    No Known Allergies    Intolerances        MEDICATIONS  (STANDING):  amLODIPine   Tablet 10 milliGRAM(s) Oral daily  cholestyramine Powder (Sugar-Free) 4 Gram(s) Oral two times a day  heparin   Injectable 5000 Unit(s) SubCutaneous every 12 hours    MEDICATIONS  (PRN):  acetaminophen     Tablet .. 650 milliGRAM(s) Oral every 6 hours PRN Temp greater or equal to 38C (100.4F), Mild Pain (1 - 3)  melatonin 3 milliGRAM(s) Oral at bedtime PRN Insomnia  ondansetron Injectable 4 milliGRAM(s) IV Push every 8 hours PRN Nausea and/or Vomiting      REVIEW OF SYSTEMS:    no new complaints     VITAL SIGNS:  T(C): 36.9 (03-14-22 @ 04:55), Max: 37.3 (03-13-22 @ 17:04)  T(F): 98.4 (03-14-22 @ 04:55), Max: 99.1 (03-13-22 @ 17:04)  HR: 91 (03-14-22 @ 04:55) (80 - 91)  BP: 154/84 (03-14-22 @ 04:55) (136/77 - 154/84)  RR: 17 (03-14-22 @ 04:55) (17 - 18)  SpO2: 94% (03-14-22 @ 04:55) (93% - 94%)  Wt(kg): --    PHYSICAL EXAM:    Gen:  No acute distress  Heent: PERRLA EOMI  Heart: RRR S1S2 no murmur  Lungs: Clear to auscultation  Abd: BS + soft and depressible non tender  Ext: No cyanosis or edema  Neuro: no deficit, neck supple    LABS:                         10.9   9.51  )-----------( 235      ( 13 Mar 2022 06:50 )             33.9     03-13    139  |  110<H>  |  11  ----------------------------<  99  4.0   |  24  |  0.62    Ca    8.6      13 Mar 2022 06:50          Culture Results:   Norovirus GI/GII  DETECTED by PCR  *******Please Note:*******  GI panel PCR evaluates for:  Campylobacter, Plesiomonas shigelloides, Salmonella,  Vibrio, Yersinia enterocolitica, Enteroaggregative  Escherichia coli (EAEC), Enteropathogenic E.coli (EPEC),  Enterotoxigenic E. coli (ETEC) lt/st, Shiga-like  toxin-producing E. coli (STEC) stx1/stx2,  Shigella/ Enteroinvasive E. coli (EIEC), Cryptosporidium,  Cyclospora cayetanensis, Entamoeba histolytica,  Giardia lamblia, Adenovirus F 40/41, Astrovirus,  Norovirus GI/GII, Rotavirus A, Sapovirus (03-08 @ 14:50)  Culture Results:   No enteric pathogens isolated.  (Stool culture examined for Salmonella,  Shigella, Campylobacter, Aeromonas, Plesiomonas,  Vibrio, E.coli O157 and Yersinia) (03-08 @ 14:50)  Culture Results:   No Protozoa seen by trichrome stain  No Helminths or Protozoa seen in formalin concentrate  performed by iodine stain  (routine O+P not evaluated for Microsporidia,  Cryptosporidia, Cyclospora, or Isospora.)  One negative sample does not necessarily rule  out the presence of a parasitic infection.  Moderate Yeast like cells seen (03-08 @ 14:49)  Culture Results:   <10,000 CFU/mL Normal Urogenital Radha (03-08 @ 00:34)                    
HPI:  87yo, WF with h/o  HLD, Depression, HTN, Lasix for LE edema [facility]  for [facility], vomiting  and diarrhea x 3 days. non-bloody diarrhea x 3 episodes per pt, vomiting and persistent nausea - no noted previous abdominal surgeries or scars on examination -non-distended - passing gas. Patient denies sick contacts.  Denies CP, SOB, PND, cough, palpitation, chills, weakness, abdominal pain, dysuria ,HA, dizziness.    In ER,  rectal temp of 103 F, low grade tachycardia 103 Sinus tachycardia on EKG.     (06 Mar 2022 13:59)      Allergies    No Known Allergies    Intolerances        MEDICATIONS  (STANDING):  cefTRIAXone   IVPB      cefTRIAXone   IVPB 1000 milliGRAM(s) IV Intermittent every 24 hours  cholestyramine Powder (Sugar-Free) 4 Gram(s) Oral two times a day  heparin   Injectable 5000 Unit(s) SubCutaneous every 12 hours    MEDICATIONS  (PRN):  acetaminophen     Tablet .. 650 milliGRAM(s) Oral every 6 hours PRN Temp greater or equal to 38C (100.4F), Mild Pain (1 - 3)  aluminum hydroxide/magnesium hydroxide/simethicone Suspension 30 milliLiter(s) Oral every 4 hours PRN Dyspepsia  melatonin 3 milliGRAM(s) Oral at bedtime PRN Insomnia  ondansetron Injectable 4 milliGRAM(s) IV Push every 8 hours PRN Nausea and/or Vomiting      REVIEW OF SYSTEMS:    resolved diarrhea     VITAL SIGNS:  T(C): 36.9 (03-09-22 @ 05:11), Max: 38.3 (03-08-22 @ 16:10)  T(F): 98.4 (03-09-22 @ 05:11), Max: 100.9 (03-08-22 @ 16:10)  HR: 75 (03-09-22 @ 05:11) (66 - 88)  BP: 156/82 (03-09-22 @ 05:11) (144/71 - 157/82)  RR: 18 (03-09-22 @ 05:11) (16 - 20)  SpO2: 96% (03-09-22 @ 05:11) (91% - 96%)  Wt(kg): --    PHYSICAL EXAM:    Gen: AAO x 3 No acute distress  Heent: PERRLA EOMI  Heart: RRR S1S2 no murmur  Lungs: Clear to auscultation  Abd: BS + soft and depressible non tender  Ext: No cyanosis or edema  Neuro: no deficit, neck supple  Skin: No rash   Back: No CVA tenderness     LABS:                         10.8   7.00  )-----------( 172      ( 09 Mar 2022 06:55 )             34.0     03-09    144  |  113<H>  |  10  ----------------------------<  95  3.3<L>   |  25  |  0.66    Ca    8.3<L>      09 Mar 2022 06:55                  Thyroid Stimulating Hormone, Serum: 3.610 uIU/mL (03-08 @ 06:36)              C Diff by PCR Result: NotDetec (03-08 @ 15:25)    Culture Results:   Norovirus GI/GII  DETECTED by PCR  *******Please Note:*******  GI panel PCR evaluates for:  Campylobacter, Plesiomonas shigelloides, Salmonella,  Vibrio, Yersinia enterocolitica, Enteroaggregative  Escherichia coli (EAEC), Enteropathogenic E.coli (EPEC),  Enterotoxigenic E. coli (ETEC) lt/st, Shiga-like  toxin-producing E. coli (STEC) stx1/stx2,  Shigella/ Enteroinvasive E. coli (EIEC), Cryptosporidium,  Cyclospora cayetanensis, Entamoeba histolytica,  Giardia lamblia, Adenovirus F 40/41, Astrovirus,  Norovirus GI/GII, Rotavirus A, Sapovirus (03-08 @ 14:50)  Culture Results:   Testing in progress (03-08 @ 14:49)  Culture Results:   <10,000 CFU/mL Normal Urogenital Radha (03-08 @ 00:34)  Culture Results:   Culture grew 3 or more types of organisms which indicate  collection contamination; consider recollection only if clinically  indicated. (03-06 @ 13:03)  Culture Results:   No growth to date. (03-06 @ 12:13)  Culture Results:   No growth to date. (03-06 @ 12:13)                    
Patient is a 86y old  Female who presents with a chief complaint of N/V/D (06 Mar 2022 13:59)      SUBJECTIVE/OBJECTIVE:    Without complaints. Patient resting comfortably.       MEDICATIONS  (STANDING):  cefTRIAXone   IVPB      cefTRIAXone   IVPB 1000 milliGRAM(s) IV Intermittent once  heparin   Injectable 5000 Unit(s) SubCutaneous every 12 hours  metroNIDAZOLE  IVPB      metroNIDAZOLE  IVPB 500 milliGRAM(s) IV Intermittent every 8 hours  sodium chloride 0.9%. 1000 milliLiter(s) (70 mL/Hr) IV Continuous <Continuous>    MEDICATIONS  (PRN):  acetaminophen     Tablet .. 650 milliGRAM(s) Oral every 6 hours PRN Temp greater or equal to 38C (100.4F), Mild Pain (1 - 3)  aluminum hydroxide/magnesium hydroxide/simethicone Suspension 30 milliLiter(s) Oral every 4 hours PRN Dyspepsia  melatonin 3 milliGRAM(s) Oral at bedtime PRN Insomnia  ondansetron Injectable 4 milliGRAM(s) IV Push every 8 hours PRN Nausea and/or Vomiting      Allergies    No Known Allergies    Intolerances          Vital Signs Last 24 Hrs  T(C): 37 (07 Mar 2022 11:18), Max: 37.8 (07 Mar 2022 00:23)  T(F): 98.6 (07 Mar 2022 11:18), Max: 100 (07 Mar 2022 00:23)  HR: 70 (07 Mar 2022 11:18) (70 - 80)  BP: 138/78 (07 Mar 2022 11:18) (134/74 - 138/78)  BP(mean): --  RR: 20 (07 Mar 2022 11:18) (16 - 20)  SpO2: 98% (07 Mar 2022 11:18) (95% - 98%)          Physical Exam: PHYSICAL EXAM:  GENERAL: NAD,   HEAD:  Atraumatic, Normocephalic  EYES:  PERRLA, conjunctiva and sclera clear  ENMT:   Dry mucous membranes, No lesions  NECK: Supple,   NERVOUS SYSTEM:  Alert ; Motor Strength 4/5   CHEST/LUNG: Clear bilaterally; No rales, rhonchi, wheezing, or rubs  HEART: Regular rate and rhythm; No murmurs, rubs, or gallops  ABDOMEN: Soft, Nontender, Nondistended; Bowel sounds present  EXTREMITIES:  No clubbing, cyanosis.   1+ edema  SKIN: No rashes or lesions            LABS:                        10.3   6.12  )-----------( 143      ( 07 Mar 2022 06:26 )             34.0     03-07    144  |  111<H>  |  18  ----------------------------<  109<H>  3.6   |  28  |  0.92    Ca    8.0<L>      07 Mar 2022 06:26    TPro  6.3  /  Alb  2.3<L>  /  TBili  0.3  /  DBili  x   /  AST  25  /  ALT  15  /  AlkPhos  71  03-07    PT/INR - ( 06 Mar 2022 08:47 )   PT: 11.7 sec;   INR: 0.98 ratio         PTT - ( 06 Mar 2022 08:47 )  PTT:32.0 sec  Urinalysis Basic - ( 06 Mar 2022 10:11 )    Color: Yellow / Appearance: Slightly Turbid / S.010 / pH: x  Gluc: x / Ketone: Negative  / Bili: Negative / Urobili: Negative mg/dL   Blood: x / Protein: 30 mg/dL / Nitrite: Positive   Leuk Esterase: Moderate / RBC: 11-25 /HPF / WBC 11-25   Sq Epi: x / Non Sq Epi: Few / Bacteria: Many      Procalcitonin, Serum: 3.07: Note: Concentrations <0.5 ng/mL do not exclude an infection, on account               RADIOLOGY & ADDITIONAL TESTS:        Consultant(s) Notes Reviewed:  [ ] YES  [ ] NO  
Patient is a 86y old  Female who presents with a chief complaint of N/V/D (07 Mar 2022 16:49)      SUBJECTIVE/OBJECTIVE:     Patient resting comfortably.   Still notes diarrhea    MEDICATIONS  (STANDING):  cefTRIAXone   IVPB      cefTRIAXone   IVPB 1000 milliGRAM(s) IV Intermittent every 24 hours  cholestyramine Powder (Sugar-Free) 4 Gram(s) Oral two times a day  furosemide   Injectable 40 milliGRAM(s) IV Push once  heparin   Injectable 5000 Unit(s) SubCutaneous every 12 hours    MEDICATIONS  (PRN):  acetaminophen     Tablet .. 650 milliGRAM(s) Oral every 6 hours PRN Temp greater or equal to 38C (100.4F), Mild Pain (1 - 3)  aluminum hydroxide/magnesium hydroxide/simethicone Suspension 30 milliLiter(s) Oral every 4 hours PRN Dyspepsia  melatonin 3 milliGRAM(s) Oral at bedtime PRN Insomnia  ondansetron Injectable 4 milliGRAM(s) IV Push every 8 hours PRN Nausea and/or Vomiting      Allergies    No Known Allergies    Intolerances          Vital Signs Last 24 Hrs  T(C): 36.7 (08 Mar 2022 11:21), Max: 37.2 (07 Mar 2022 16:55)  T(F): 98.1 (08 Mar 2022 11:21), Max: 98.9 (07 Mar 2022 16:55)  HR: 66 (08 Mar 2022 11:21) (66 - 71)  BP: 144/71 (08 Mar 2022 11:21) (132/73 - 144/80)  BP(mean): --  RR: 16 (08 Mar 2022 11:21) (16 - 20)  SpO2: 96% (08 Mar 2022 11:21) (95% - 99%)                  Physical Exam: PHYSICAL EXAM:  GENERAL: NAD,   HEAD:  Atraumatic, Normocephalic  EYES:  PERRLA, conjunctiva and sclera clear  ENMT:   Dry mucous membranes, No lesions  NECK: Supple,   NERVOUS SYSTEM:  Alert ; Motor Strength 4/5   CHEST/LUNG: Clear bilaterally; No rales, rhonchi, wheezing, or rubs  HEART: Regular rate and rhythm; No murmurs, rubs, or gallops  ABDOMEN: Soft, Nontender, Nondistended; Bowel sounds present  EXTREMITIES:  No clubbing, cyanosis.   2+ edema  SKIN: No rashes or lesions    LABS:                        10.4   6.45  )-----------( 154      ( 08 Mar 2022 06:36 )             34.0     03-08    142  |  113<H>  |  11  ----------------------------<  97  3.5   |  25  |  0.66    Ca    7.9<L>      08 Mar 2022 06:36    TPro  6.3  /  Alb  2.3<L>  /  TBili  0.3  /  DBili  x   /  AST  25  /  ALT  15  /  AlkPhos  71  03-07    Vitamin B12, Serum: 1080 pg/mL (03.08.22 @ 09:29)   Folate, Serum: 7.0 ng/mL (03.08.22 @ 09:29)   CAPILLARY BLOOD GLUCOSE              RADIOLOGY & ADDITIONAL TESTS:        Consultant(s) Notes Reviewed:  [xx ] YES  [ ] NO  
Patient is a 86y old  Female who presents with a chief complaint of N/V/D (08 Mar 2022 12:53)      HPI:  85yo, WF with h/o  HLD, Depression, HTN, Lasix for LE edema BIBEMS from [facility]  for nausea, vomiting  and diarrhea x 3 days. non-bloody diarrhea x 3 episodes per pt, vomiting and persistent nausea - no noted previous abdominal surgeries or scars on examination -non-distended - passing gas. Patient denies sick contacts.  Denies CP, SOB, PND, cough, palpitation, chills, weakness, abdominal pain, dysuria ,HA, dizziness.    In ER,  rectal temp of 103 F, low grade tachycardia 103 Sinus tachycardia on EKG.     (06 Mar 2022 13:59)      INTERVAL HPI/OVERNIGHT EVENTS: patient seen earlier today  Diarrhea - 7 AM - 7 PM watery x 2, 7 PM - 7 AM x1 - watery on full liquid diet. The patient denies melena, hematochezia, hematemesis, nausea, vomiting, abdominal pain, constipation, or change in bowel movements     MEDICATIONS  (STANDING):  cefTRIAXone   IVPB      cefTRIAXone   IVPB 1000 milliGRAM(s) IV Intermittent every 24 hours  cholestyramine Powder (Sugar-Free) 4 Gram(s) Oral two times a day  heparin   Injectable 5000 Unit(s) SubCutaneous every 12 hours    MEDICATIONS  (PRN):  acetaminophen     Tablet .. 650 milliGRAM(s) Oral every 6 hours PRN Temp greater or equal to 38C (100.4F), Mild Pain (1 - 3)  aluminum hydroxide/magnesium hydroxide/simethicone Suspension 30 milliLiter(s) Oral every 4 hours PRN Dyspepsia  melatonin 3 milliGRAM(s) Oral at bedtime PRN Insomnia  ondansetron Injectable 4 milliGRAM(s) IV Push every 8 hours PRN Nausea and/or Vomiting      FAMILY HISTORY:      Allergies    No Known Allergies    Intolerances        PMH/PSH:        REVIEW OF SYSTEMS:  CONSTITUTIONAL: No fever, weight loss,   EYES: No eye pain, visual disturbances, or discharge  ENMT:  No difficulty hearing, tinnitus, vertigo; No sinus or throat pain  NECK: No pain or stiffness  BREASTS: No pain, masses, or nipple discharge  RESPIRATORY: No cough, wheezing, chills or hemoptysis; No shortness of breath  CARDIOVASCULAR: No chest pain, palpitations, dizziness, or leg swelling  GASTROINTESTINAL:  see above   GENITOURINARY: No dysuria, frequency, hematuria, or incontinence  NEUROLOGICAL: No headaches,  numbness, or tremors  SKIN: No itching, burning, rashes, or lesions   LYMPH NODES: No enlarged glands  ENDOCRINE: No heat or cold intolerance; No hair loss  MUSCULOSKELETAL: No joint pain or swelling; No muscle, back, or extremity pain  PSYCHIATRIC: No depression, anxiety, mood swings, or difficulty sleeping  HEME/LYMPH: No easy bruising, or bleeding gums  ALLERGY AND IMMUNOLOGIC: No hives or eczema    Vital Signs Last 24 Hrs  T(C): 36.7 (08 Mar 2022 11:21), Max: 37.2 (07 Mar 2022 16:55)  T(F): 98.1 (08 Mar 2022 11:21), Max: 98.9 (07 Mar 2022 16:55)  HR: 79 (08 Mar 2022 14:44) (66 - 79)  BP: 152/77 (08 Mar 2022 14:44) (132/73 - 152/77)  BP(mean): --  RR: 20 (08 Mar 2022 14:44) (16 - 20)  SpO2: 91% (08 Mar 2022 14:44) (91% - 99%)    PHYSICAL EXAM:  GENERAL: NAD, well-groomed, well-developed  HEAD:  Atraumatic, Normocephalic  EYES: EOMI, PERRLA, conjunctiva and sclera clear  NECK: Supple, No JVD, Normal thyroid  NERVOUS SYSTEM:  Alert & Oriented X2, Good concentration; Motor Strength 5/5 B/L upper and lower extremities;   CHEST/LUNG: Clear to percussion bilaterally; No rales, rhonchi, wheezing, or rubs  HEART: Regular rate and rhythm; No murmurs, rubs, or gallops  ABDOMEN: Soft, Nontender, Nondistended; Bowel sounds present  EXTREMITIES:  2+ Peripheral Pulses, No clubbing, cyanosis, or edema  LYMPH: No lymphadenopathy noted  SKIN: No rashes or lesions    LAB                          10.4   6.45  )-----------( 154      ( 08 Mar 2022 06:36 )             34.0       CBC:  03-08 @ 06:36  WBC 6.45   Hgb 10.4   Hct 34.0   Plts 154  MCV 94.7  03-07 @ 06:26  WBC 6.12   Hgb 10.3   Hct 34.0   Plts 143  MCV 95.2  03-06 @ 08:47  WBC 13.88   Hgb 12.3   Hct 39.1   Plts 199  MCV 93.1      Chemistry:  03-08 @ 06:36  Na+ 142  K+ 3.5  Cl- 113  CO2 25  BUN 11  Cr 0.66     03-07 @ 06:26  Na+ 144  K+ 3.6  Cl- 111  CO2 28  BUN 18  Cr 0.92     03-06 @ 08:47  Na+ 143  K+ 3.9  Cl- 110  CO2 30  BUN 24  Cr 1.02         Glucose, Serum: 97 mg/dL (03-08 @ 06:36)  Glucose, Serum: 109 mg/dL (03-07 @ 06:26)  Glucose, Serum: 122 mg/dL (03-06 @ 08:47)      08 Mar 2022 06:36    142    |  113    |  11     ----------------------------<  97     3.5     |  25     |  0.66   07 Mar 2022 06:26    144    |  111    |  18     ----------------------------<  109    3.6     |  28     |  0.92   06 Mar 2022 08:47    143    |  110    |  24     ----------------------------<  122    3.9     |  30     |  1.02     Ca    7.9        08 Mar 2022 06:36  Ca    8.0        07 Mar 2022 06:26  Ca    9.3        06 Mar 2022 08:47    TPro  6.3    /  Alb  2.3    /  TBili  0.3    /  DBili  x      /  AST  25     /  ALT  15     /  AlkPhos  71     07 Mar 2022 06:26  TPro  7.8    /  Alb  3.0    /  TBili  0.4    /  DBili  x      /  AST  21     /  ALT  13     /  AlkPhos  108    06 Mar 2022 08:47              CAPILLARY BLOOD GLUCOSE          C-Reactive Protein, Serum: 41 mg/L (03-08 @ 09:28)      RADIOLOGY & ADDITIONAL TESTS:    Imaging Personally Reviewed:  [ ] YES  [ ] NO    Consultant(s) Notes Reviewed:  [ ] YES  [ ] NO    Care Discussed with Consultants/Other Providers [ ] YES  [ ] NO
Patient is a 86y old  Female who presents with a chief complaint of N/V/D (10 Mar 2022 14:40)      HPI:  85yo, WF with h/o  HLD, Depression, HTN, Lasix for LE edema BIBEMS from [facility]  for nausea, vomiting  and diarrhea x 3 days. non-bloody diarrhea x 3 episodes per pt, vomiting and persistent nausea - no noted previous abdominal surgeries or scars on examination -non-distended - passing gas. Patient denies sick contacts.  Denies CP, SOB, PND, cough, palpitation, chills, weakness, abdominal pain, dysuria ,HA, dizziness.    In ER,  rectal temp of 103 F, low grade tachycardia 103 Sinus tachycardia on EKG.     (06 Mar 2022 13:59)      INTERVAL HPI/OVERNIGHT EVENTS:  Diarrhea (+)   The patient /  nurse deny melena, hematochezia, hematemesis, nausea, vomiting, abdominal pain, constipation.     MEDICATIONS  (STANDING):  amLODIPine   Tablet 10 milliGRAM(s) Oral daily  cholestyramine Powder (Sugar-Free) 4 Gram(s) Oral two times a day  heparin   Injectable 5000 Unit(s) SubCutaneous every 12 hours    MEDICATIONS  (PRN):  acetaminophen     Tablet .. 650 milliGRAM(s) Oral every 6 hours PRN Temp greater or equal to 38C (100.4F), Mild Pain (1 - 3)  aluminum hydroxide/magnesium hydroxide/simethicone Suspension 30 milliLiter(s) Oral every 4 hours PRN Dyspepsia  melatonin 3 milliGRAM(s) Oral at bedtime PRN Insomnia  ondansetron Injectable 4 milliGRAM(s) IV Push every 8 hours PRN Nausea and/or Vomiting      FAMILY HISTORY:      Allergies    No Known Allergies    Intolerances        PMH/PSH:        REVIEW OF SYSTEMS:  CONSTITUTIONAL: No fever, weight loss,   EYES: No eye pain, visual disturbances, or discharge  ENMT:  No difficulty hearing, tinnitus, vertigo; No sinus or throat pain  NECK: No pain or stiffness  BREASTS: No pain, masses, or nipple discharge  RESPIRATORY: No cough, wheezing, chills or hemoptysis; No shortness of breath  CARDIOVASCULAR: No chest pain, palpitations, dizziness, or leg swelling  GASTROINTESTINAL: See above   GENITOURINARY: No dysuria, frequency, hematuria, or incontinence  NEUROLOGICAL: No headaches, numbness, or tremors  SKIN: No itching, burning, rashes, or lesions   LYMPH NODES: No enlarged glands  ENDOCRINE: No heat or cold intolerance; No hair loss  MUSCULOSKELETAL: No joint pain or swelling; No muscle, back, or extremity pain  PSYCHIATRIC: No depression, anxiety, mood swings, or difficulty sleeping  HEME/LYMPH: No easy bruising, or bleeding gums  ALLERGY AND IMMUNOLOGIC: No hives or eczema    Vital Signs Last 24 Hrs  T(C): 37.1 (10 Mar 2022 11:16), Max: 37.1 (09 Mar 2022 16:54)  T(F): 98.7 (10 Mar 2022 11:16), Max: 98.8 (09 Mar 2022 16:54)  HR: 77 (10 Mar 2022 11:16) (70 - 80)  BP: 147/79 (10 Mar 2022 11:16) (147/79 - 170/83)  BP(mean): --  RR: 19 (10 Mar 2022 11:16) (17 - 19)  SpO2: 94% (10 Mar 2022 11:16) (92% - 94%)    PHYSICAL EXAM:  GENERAL: NAD, well-groomed, well-developed  HEAD:  Atraumatic, Normocephalic  EYES: EOMI, PERRLA, conjunctiva and sclera clear  NECK: Supple, No JVD, Normal thyroid  NERVOUS SYSTEM:  Alert & confused, Fair  concentration;   CHEST/LUNG: Clear to percussion bilaterally; No rales, rhonchi, wheezing, or rubs  HEART: Regular rate and rhythm; No murmurs, rubs, or gallops  ABDOMEN: Soft, Nontender, Nondistended; Bowel sounds present  EXTREMITIES:  2+ Peripheral Pulses, No clubbing, cyanosis, or edema  LYMPH: No lymphadenopathy noted  SKIN: No rashes or lesions    LAB                          11.4   6.80  )-----------( 175      ( 10 Mar 2022 06:36 )             35.5       CBC:  03-10 @ 06:36  WBC 6.80   Hgb 11.4   Hct 35.5   Plts 175  MCV 89.6  03-09 @ 06:55  WBC 7.00   Hgb 10.8   Hct 34.0   Plts 172  MCV 90.9  03-08 @ 06:36  WBC 6.45   Hgb 10.4   Hct 34.0   Plts 154  MCV 94.7  03-07 @ 06:26  WBC 6.12   Hgb 10.3   Hct 34.0   Plts 143  MCV 95.2  03-06 @ 08:47  WBC 13.88   Hgb 12.3   Hct 39.1   Plts 199  MCV 93.1      Chemistry:  03-10 @ 06:36  Na+ 141  K+ 4.4  Cl- 112  CO2 24  BUN 9  Cr 0.59     03-09 @ 06:55  Na+ 144  K+ 3.3  Cl- 113  CO2 25  BUN 10  Cr 0.66     03-08 @ 06:36  Na+ 142  K+ 3.5  Cl- 113  CO2 25  BUN 11  Cr 0.66     03-07 @ 06:26  Na+ 144  K+ 3.6  Cl- 111  CO2 28  BUN 18  Cr 0.92     03-06 @ 08:47  Na+ 143  K+ 3.9  Cl- 110  CO2 30  BUN 24  Cr 1.02         Glucose, Serum: 96 mg/dL (03-10 @ 06:36)  Glucose, Serum: 95 mg/dL (03-09 @ 06:55)  Glucose, Serum: 97 mg/dL (03-08 @ 06:36)  Glucose, Serum: 109 mg/dL (03-07 @ 06:26)  Glucose, Serum: 122 mg/dL (03-06 @ 08:47)      10 Mar 2022 06:36    141    |  112    |  9      ----------------------------<  96     4.4     |  24     |  0.59   09 Mar 2022 06:55    144    |  113    |  10     ----------------------------<  95     3.3     |  25     |  0.66   08 Mar 2022 06:36    142    |  113    |  11     ----------------------------<  97     3.5     |  25     |  0.66   07 Mar 2022 06:26    144    |  111    |  18     ----------------------------<  109    3.6     |  28     |  0.92   06 Mar 2022 08:47    143    |  110    |  24     ----------------------------<  122    3.9     |  30     |  1.02     Ca    9.0        10 Mar 2022 06:36  Ca    8.3        09 Mar 2022 06:55  Ca    7.9        08 Mar 2022 06:36  Ca    8.0        07 Mar 2022 06:26  Ca    9.3        06 Mar 2022 08:47    TPro  6.3    /  Alb  2.3    /  TBili  0.3    /  DBili  x      /  AST  25     /  ALT  15     /  AlkPhos  71     07 Mar 2022 06:26  TPro  7.8    /  Alb  3.0    /  TBili  0.4    /  DBili  x      /  AST  21     /  ALT  13     /  AlkPhos  108    06 Mar 2022 08:47              CAPILLARY BLOOD GLUCOSE          C-Reactive Protein, Serum: 41 mg/L (03-08 @ 09:28)      RADIOLOGY & ADDITIONAL TESTS:    Imaging Personally Reviewed:  [ ] YES  [ ] NO    Consultant(s) Notes Reviewed:  [ ] YES  [ ] NO    Care Discussed with Consultants/Other Providers [ ] YES  [ ] NO
Patient is a 86y old  Female who presents with a chief complaint of N/V/D (11 Mar 2022 14:24)      HPI:  85yo, WF with h/o  HLD, Depression, HTN, Lasix for LE edema BIBEMS from [facility]  for nausea, vomiting  and diarrhea x 3 days. non-bloody diarrhea x 3 episodes per pt, vomiting and persistent nausea - no noted previous abdominal surgeries or scars on examination -non-distended - passing gas. Patient denies sick contacts.  Denies CP, SOB, PND, cough, palpitation, chills, weakness, abdominal pain, dysuria ,HA, dizziness.    In ER,  rectal temp of 103 F, low grade tachycardia 103 Sinus tachycardia on EKG.     (06 Mar 2022 13:59)      INTERVAL HPI/OVERNIGHT EVENTS:  The patient  / nurse deny melena, hematochezia, hematemesis, nausea, vomiting, abdominal pain, constipation, diarrhea, or change in bowel movements     MEDICATIONS  (STANDING):  amLODIPine   Tablet 10 milliGRAM(s) Oral daily  cholestyramine Powder (Sugar-Free) 4 Gram(s) Oral two times a day  heparin   Injectable 5000 Unit(s) SubCutaneous every 12 hours    MEDICATIONS  (PRN):  acetaminophen     Tablet .. 650 milliGRAM(s) Oral every 6 hours PRN Temp greater or equal to 38C (100.4F), Mild Pain (1 - 3)  aluminum hydroxide/magnesium hydroxide/simethicone Suspension 30 milliLiter(s) Oral every 4 hours PRN Dyspepsia  melatonin 3 milliGRAM(s) Oral at bedtime PRN Insomnia  ondansetron Injectable 4 milliGRAM(s) IV Push every 8 hours PRN Nausea and/or Vomiting      FAMILY HISTORY:      Allergies    No Known Allergies    Intolerances        PMH/PSH:        REVIEW OF SYSTEMS:  CONSTITUTIONAL: No fever, weight loss, or fatigue  EYES: No eye pain, visual disturbances, or discharge  ENMT:  No difficulty hearing, tinnitus, vertigo; No sinus or throat pain  NECK: No pain or stiffness  BREASTS: No pain, masses, or nipple discharge  RESPIRATORY: No cough, wheezing, chills or hemoptysis; No shortness of breath  CARDIOVASCULAR: No chest pain, palpitations, dizziness, or leg swelling  GASTROINTESTINAL: See above   GENITOURINARY: No dysuria, frequency, hematuria, or incontinence  NEUROLOGICAL: No headaches, , numbness, or tremors  SKIN: No itching, burning, rashes, or lesions   LYMPH NODES: No enlarged glands  ENDOCRINE: No heat or cold intolerance; No hair loss  MUSCULOSKELETAL: No joint pain or swelling; No muscle, back, or extremity pain  PSYCHIATRIC: No depression, anxiety, mood swings, or difficulty sleeping  HEME/LYMPH: No easy bruising, or bleeding gums  ALLERGY AND IMMUNOLOGIC: No hives or eczema    Vital Signs Last 24 Hrs  T(C): 36.9 (11 Mar 2022 11:25), Max: 37.7 (10 Mar 2022 16:45)  T(F): 98.4 (11 Mar 2022 11:25), Max: 99.8 (10 Mar 2022 16:45)  HR: 80 (11 Mar 2022 11:25) (75 - 80)  BP: 158/83 (11 Mar 2022 11:25) (151/77 - 162/80)  BP(mean): --  RR: 19 (11 Mar 2022 11:25) (18 - 19)  SpO2: 96% (11 Mar 2022 11:25) (92% - 96%)    PHYSICAL EXAM:  GENERAL: NAD, well-groomed, well-developed  HEAD:  Atraumatic, Normocephalic  EYES: EOMI, PERRLA, conjunctiva and sclera clear  NECK: Supple, No JVD, Normal thyroid  NERVOUS SYSTEM:  Alert, Good concentration; Motor Strength 5/5 B/L upper and lower extremities;   CHEST/LUNG: Clear to percussion bilaterally; No rales, rhonchi, wheezing, or rubs  HEART: Regular rate and rhythm; No murmurs, rubs, or gallops  ABDOMEN: Soft, Nontender, Nondistended; Bowel sounds present  EXTREMITIES:  2+ Peripheral Pulses, No clubbing, cyanosis, or edema  LYMPH: No lymphadenopathy noted  SKIN: No rashes or lesions    LAB                          11.2   7.16  )-----------( 193      ( 11 Mar 2022 06:29 )             35.2       CBC:  03-11 @ 06:29  WBC 7.16   Hgb 11.2   Hct 35.2   Plts 193  MCV 90.5  03-10 @ 06:36  WBC 6.80   Hgb 11.4   Hct 35.5   Plts 175  MCV 89.6  03-09 @ 06:55  WBC 7.00   Hgb 10.8   Hct 34.0   Plts 172  MCV 90.9  03-08 @ 06:36  WBC 6.45   Hgb 10.4   Hct 34.0   Plts 154  MCV 94.7  03-07 @ 06:26  WBC 6.12   Hgb 10.3   Hct 34.0   Plts 143  MCV 95.2  03-06 @ 08:47  WBC 13.88   Hgb 12.3   Hct 39.1   Plts 199  MCV 93.1      Chemistry:  03-11 @ 06:29  Na+ 139  K+ 4.3  Cl- 108  CO2 25  BUN 9  Cr 0.73     03-10 @ 06:36  Na+ 141  K+ 4.4  Cl- 112  CO2 24  BUN 9  Cr 0.59     03-09 @ 06:55  Na+ 144  K+ 3.3  Cl- 113  CO2 25  BUN 10  Cr 0.66     03-08 @ 06:36  Na+ 142  K+ 3.5  Cl- 113  CO2 25  BUN 11  Cr 0.66     03-07 @ 06:26  Na+ 144  K+ 3.6  Cl- 111  CO2 28  BUN 18  Cr 0.92     03-06 @ 08:47  Na+ 143  K+ 3.9  Cl- 110  CO2 30  BUN 24  Cr 1.02         Glucose, Serum: 101 mg/dL (03-11 @ 06:29)  Glucose, Serum: 96 mg/dL (03-10 @ 06:36)  Glucose, Serum: 95 mg/dL (03-09 @ 06:55)  Glucose, Serum: 97 mg/dL (03-08 @ 06:36)  Glucose, Serum: 109 mg/dL (03-07 @ 06:26)  Glucose, Serum: 122 mg/dL (03-06 @ 08:47)      11 Mar 2022 06:29    139    |  108    |  9      ----------------------------<  101    4.3     |  25     |  0.73   10 Mar 2022 06:36    141    |  112    |  9      ----------------------------<  96     4.4     |  24     |  0.59   09 Mar 2022 06:55    144    |  113    |  10     ----------------------------<  95     3.3     |  25     |  0.66   08 Mar 2022 06:36    142    |  113    |  11     ----------------------------<  97     3.5     |  25     |  0.66   07 Mar 2022 06:26    144    |  111    |  18     ----------------------------<  109    3.6     |  28     |  0.92   06 Mar 2022 08:47    143    |  110    |  24     ----------------------------<  122    3.9     |  30     |  1.02     Ca    9.1        11 Mar 2022 06:29  Ca    9.0        10 Mar 2022 06:36  Ca    8.3        09 Mar 2022 06:55  Ca    7.9        08 Mar 2022 06:36  Ca    8.0        07 Mar 2022 06:26  Ca    9.3        06 Mar 2022 08:47    TPro  7.1    /  Alb  2.6    /  TBili  0.5    /  DBili  0.2    /  AST  33     /  ALT  23     /  AlkPhos  73     11 Mar 2022 06:29  TPro  6.3    /  Alb  2.3    /  TBili  0.3    /  DBili  x      /  AST  25     /  ALT  15     /  AlkPhos  71     07 Mar 2022 06:26  TPro  7.8    /  Alb  3.0    /  TBili  0.4    /  DBili  x      /  AST  21     /  ALT  13     /  AlkPhos  108    06 Mar 2022 08:47              CAPILLARY BLOOD GLUCOSE          C-Reactive Protein, Serum: 41 mg/L (03-08 @ 09:28)      RADIOLOGY & ADDITIONAL TESTS:    Imaging Personally Reviewed:  [ ] YES  [ ] NO    Consultant(s) Notes Reviewed:  [ ] YES  [ ] NO    Care Discussed with Consultants/Other Providers [ ] YES  [ ] NO
Patient is a 86y old  Female who presents with a chief complaint of N/V/D (11 Mar 2022 15:46)      SUBJECTIVE/OBJECTIVE:    Without complaints. Patient resting comfortably.     MEDICATIONS  (STANDING):  amLODIPine   Tablet 10 milliGRAM(s) Oral daily  cholestyramine Powder (Sugar-Free) 4 Gram(s) Oral two times a day  heparin   Injectable 5000 Unit(s) SubCutaneous every 12 hours    MEDICATIONS  (PRN):  acetaminophen     Tablet .. 650 milliGRAM(s) Oral every 6 hours PRN Temp greater or equal to 38C (100.4F), Mild Pain (1 - 3)  aluminum hydroxide/magnesium hydroxide/simethicone Suspension 30 milliLiter(s) Oral every 4 hours PRN Dyspepsia  melatonin 3 milliGRAM(s) Oral at bedtime PRN Insomnia  ondansetron Injectable 4 milliGRAM(s) IV Push every 8 hours PRN Nausea and/or Vomiting      Allergies    No Known Allergies    Intolerances          Vital Signs Last 24 Hrs  T(C): 37 (12 Mar 2022 11:01), Max: 37.1 (12 Mar 2022 05:41)  T(F): 98.6 (12 Mar 2022 11:01), Max: 98.8 (12 Mar 2022 05:41)  HR: 80 (12 Mar 2022 11:01) (79 - 90)  BP: 153/82 (12 Mar 2022 11:01) (149/84 - 162/85)  BP(mean): --  RR: 17 (12 Mar 2022 11:01) (17 - 18)  SpO2: 95% (12 Mar 2022 11:01) (93% - 96%)                    PHYSICAL EXAM:  GENERAL: NAD, well-groomed, well-developed  HEAD:  Atraumatic, Normocephalic  EYES: , PERRLA, conjunctiva and sclera clear  ENMT: Intact  NECK: Supple, No JVD, Normal thyroid  NERVOUS SYSTEM:  Alert & Oriented X3; Motor Strength 4/5 B/L   CHEST/LUNG: Clear bilaterally; No rales, rhonchi, wheezing, or rubs  HEART: Regular rate and rhythm; No murmurs, rubs, or gallops  ABDOMEN: Soft, Nontender, Nondistended; Bowel sounds present  EXTREMITIES:   No clubbing, cyanosis.   Trace edema  SKIN: No rashes or lesions    LABS:                        11.2   9.14  )-----------( 207      ( 12 Mar 2022 07:22 )             33.7     03-12    137  |  109<H>  |  10  ----------------------------<  110<H>  4.1   |  24  |  0.67    Ca    8.6      12 Mar 2022 07:22    TPro  7.1  /  Alb  2.6<L>  /  TBili  0.5  /  DBili  0.2  /  AST  33  /  ALT  23  /  AlkPhos  73  03-11        CAPILLARY BLOOD GLUCOSE              RADIOLOGY & ADDITIONAL TESTS:        Consultant(s) Notes Reviewed:  [xxxx ] YES  [ ] NO  
Patient is a 86y old  Female who presents with a chief complaint of N/V/D (12 Mar 2022 12:02)      HPI:  85yo, WF with h/o  HLD, Depression, HTN, Lasix for LE edema BIBEMS from [facility]  for nausea, vomiting  and diarrhea x 3 days. non-bloody diarrhea x 3 episodes per pt, vomiting and persistent nausea - no noted previous abdominal surgeries or scars on examination -non-distended - passing gas. Patient denies sick contacts.  Denies CP, SOB, PND, cough, palpitation, chills, weakness, abdominal pain, dysuria ,HA, dizziness.    In ER,  rectal temp of 103 F, low grade tachycardia 103 Sinus tachycardia on EKG.     (06 Mar 2022 13:59)      INTERVAL HPI/OVERNIGHT EVENTS: Patient seen earlier today  Two very soft BMs earlier today but better that previous BMs. The patient / nurse deny melena, hematochezia, hematemesis, nausea, vomiting, abdominal pain, constipation, diarrhea, or change in bowel movements     MEDICATIONS  (STANDING):  amLODIPine   Tablet 10 milliGRAM(s) Oral daily  cholestyramine Powder (Sugar-Free) 4 Gram(s) Oral two times a day  heparin   Injectable 5000 Unit(s) SubCutaneous every 12 hours    MEDICATIONS  (PRN):  acetaminophen     Tablet .. 650 milliGRAM(s) Oral every 6 hours PRN Temp greater or equal to 38C (100.4F), Mild Pain (1 - 3)  melatonin 3 milliGRAM(s) Oral at bedtime PRN Insomnia  ondansetron Injectable 4 milliGRAM(s) IV Push every 8 hours PRN Nausea and/or Vomiting      FAMILY HISTORY:      Allergies    No Known Allergies    Intolerances        PMH/PSH:        REVIEW OF SYSTEMS:  CONSTITUTIONAL: No fever, weight loss, or fatigue  EYES: No eye pain, visual disturbances, or discharge  ENMT:  No difficulty hearing, tinnitus, vertigo; No sinus or throat pain  NECK: No pain or stiffness  BREASTS: No pain, masses, or nipple discharge  RESPIRATORY: No cough, wheezing, chills or hemoptysis; No shortness of breath  CARDIOVASCULAR: No chest pain, palpitations, dizziness, or leg swelling  GASTROINTESTINAL: See above   GENITOURINARY: No dysuria, frequency, hematuria, or incontinence  NEUROLOGICAL: No headaches, numbness, or tremors  SKIN: No itching, burning, rashes, or lesions   LYMPH NODES: No enlarged glands  ENDOCRINE: No heat or cold intolerance; No hair loss  MUSCULOSKELETAL: No joint pain or swelling; No muscle, back, or extremity pain  PSYCHIATRIC: No depression, anxiety, mood swings, or difficulty sleeping  HEME/LYMPH: No easy bruising, or bleeding gums  ALLERGY AND IMMUNOLOGIC: No hives or eczema    Vital Signs Last 24 Hrs  T(C): 36.7 (12 Mar 2022 16:48), Max: 37.1 (12 Mar 2022 05:41)  T(F): 98.1 (12 Mar 2022 16:48), Max: 98.8 (12 Mar 2022 05:41)  HR: 88 (12 Mar 2022 16:48) (80 - 90)  BP: 161/81 (12 Mar 2022 16:48) (149/84 - 162/85)  BP(mean): --  RR: 17 (12 Mar 2022 16:48) (17 - 18)  SpO2: 94% (12 Mar 2022 16:48) (93% - 96%)    PHYSICAL EXAM:  GENERAL: NAD, well-groomed, well-developed  HEAD:  Atraumatic, Normocephalic  EYES: EOMI, PERRLA, conjunctiva and sclera clear  NECK: Supple, No JVD, Normal thyroid  NERVOUS SYSTEM:  Alert & Oriented X2, Good concentration; Motor Strength 5/5 B/L upper and lower extremities;   CHEST/LUNG: Clear to percussion bilaterally; No rales, rhonchi, wheezing, or rubs  HEART: Regular rate and rhythm; No murmurs, rubs, or gallops  ABDOMEN: Soft, Nontender, Nondistended; Bowel sounds present  EXTREMITIES:  2+ Peripheral Pulses, No clubbing, cyanosis, or edema  LYMPH: No lymphadenopathy noted  SKIN: No rashes or lesions    LAB                          11.2   9.14  )-----------( 207      ( 12 Mar 2022 07:22 )             33.7       CBC:  03-12 @ 07:22  WBC 9.14   Hgb 11.2   Hct 33.7   Plts 207  MCV 87.5  03-11 @ 06:29  WBC 7.16   Hgb 11.2   Hct 35.2   Plts 193  MCV 90.5  03-10 @ 06:36  WBC 6.80   Hgb 11.4   Hct 35.5   Plts 175  MCV 89.6  03-09 @ 06:55  WBC 7.00   Hgb 10.8   Hct 34.0   Plts 172  MCV 90.9  03-08 @ 06:36  WBC 6.45   Hgb 10.4   Hct 34.0   Plts 154  MCV 94.7  03-07 @ 06:26  WBC 6.12   Hgb 10.3   Hct 34.0   Plts 143  MCV 95.2  03-06 @ 08:47  WBC 13.88   Hgb 12.3   Hct 39.1   Plts 199  MCV 93.1      Chemistry:  03-12 @ 07:22  Na+ 137  K+ 4.1  Cl- 109  CO2 24  BUN 10  Cr 0.67     03-11 @ 06:29  Na+ 139  K+ 4.3  Cl- 108  CO2 25  BUN 9  Cr 0.73     03-10 @ 06:36  Na+ 141  K+ 4.4  Cl- 112  CO2 24  BUN 9  Cr 0.59     03-09 @ 06:55  Na+ 144  K+ 3.3  Cl- 113  CO2 25  BUN 10  Cr 0.66     03-08 @ 06:36  Na+ 142  K+ 3.5  Cl- 113  CO2 25  BUN 11  Cr 0.66     03-07 @ 06:26  Na+ 144  K+ 3.6  Cl- 111  CO2 28  BUN 18  Cr 0.92     03-06 @ 08:47  Na+ 143  K+ 3.9  Cl- 110  CO2 30  BUN 24  Cr 1.02         Glucose, Serum: 110 mg/dL (03-12 @ 07:22)  Glucose, Serum: 101 mg/dL (03-11 @ 06:29)  Glucose, Serum: 96 mg/dL (03-10 @ 06:36)  Glucose, Serum: 95 mg/dL (03-09 @ 06:55)  Glucose, Serum: 97 mg/dL (03-08 @ 06:36)  Glucose, Serum: 109 mg/dL (03-07 @ 06:26)  Glucose, Serum: 122 mg/dL (03-06 @ 08:47)      12 Mar 2022 07:22    137    |  109    |  10     ----------------------------<  110    4.1     |  24     |  0.67   11 Mar 2022 06:29    139    |  108    |  9      ----------------------------<  101    4.3     |  25     |  0.73   10 Mar 2022 06:36    141    |  112    |  9      ----------------------------<  96     4.4     |  24     |  0.59   09 Mar 2022 06:55    144    |  113    |  10     ----------------------------<  95     3.3     |  25     |  0.66   08 Mar 2022 06:36    142    |  113    |  11     ----------------------------<  97     3.5     |  25     |  0.66   07 Mar 2022 06:26    144    |  111    |  18     ----------------------------<  109    3.6     |  28     |  0.92   06 Mar 2022 08:47    143    |  110    |  24     ----------------------------<  122    3.9     |  30     |  1.02     Ca    8.6        12 Mar 2022 07:22  Ca    9.1        11 Mar 2022 06:29  Ca    9.0        10 Mar 2022 06:36  Ca    8.3        09 Mar 2022 06:55  Ca    7.9        08 Mar 2022 06:36  Ca    8.0        07 Mar 2022 06:26  Ca    9.3        06 Mar 2022 08:47    TPro  7.1    /  Alb  2.6    /  TBili  0.5    /  DBili  0.2    /  AST  33     /  ALT  23     /  AlkPhos  73     11 Mar 2022 06:29  TPro  6.3    /  Alb  2.3    /  TBili  0.3    /  DBili  x      /  AST  25     /  ALT  15     /  AlkPhos  71     07 Mar 2022 06:26  TPro  7.8    /  Alb  3.0    /  TBili  0.4    /  DBili  x      /  AST  21     /  ALT  13     /  AlkPhos  108    06 Mar 2022 08:47              CAPILLARY BLOOD GLUCOSE          C-Reactive Protein, Serum: 41 mg/L (03-08 @ 09:28)      RADIOLOGY & ADDITIONAL TESTS:    Imaging Personally Reviewed:  [ ] YES  [ ] NO    Consultant(s) Notes Reviewed:  [ ] YES  [ ] NO    Care Discussed with Consultants/Other Providers [ ] YES  [ ] NO
Patient is a 86y old  Female who presents with a chief complaint of N/V/D (14 Mar 2022 20:30)      HPI:  85yo, WF with h/o  HLD, Depression, HTN, Lasix for LE edema BIBEMS from [facility]  for nausea, vomiting  and diarrhea x 3 days. non-bloody diarrhea x 3 episodes per pt, vomiting and persistent nausea - no noted previous abdominal surgeries or scars on examination -non-distended - passing gas. Patient denies sick contacts.  Denies CP, SOB, PND, cough, palpitation, chills, weakness, abdominal pain, dysuria ,HA, dizziness.    In ER,  rectal temp of 103 F, low grade tachycardia 103 Sinus tachycardia on EKG.     (06 Mar 2022 13:59)      INTERVAL HPI/OVERNIGHT EVENTS:  The patient / NP deniy melena, hematochezia, hematemesis, nausea, vomiting, abdominal pain, constipation, diarrhea, or change in bowel movements Tolerating diet    MEDICATIONS  (STANDING):  amLODIPine   Tablet 10 milliGRAM(s) Oral daily  heparin   Injectable 5000 Unit(s) SubCutaneous every 12 hours    MEDICATIONS  (PRN):  acetaminophen     Tablet .. 650 milliGRAM(s) Oral every 6 hours PRN Temp greater or equal to 38C (100.4F), Mild Pain (1 - 3)  melatonin 3 milliGRAM(s) Oral at bedtime PRN Insomnia  ondansetron Injectable 4 milliGRAM(s) IV Push every 8 hours PRN Nausea and/or Vomiting      FAMILY HISTORY:      Allergies    No Known Allergies    Intolerances        PMH/PSH:        REVIEW OF SYSTEMS:  CONSTITUTIONAL: No fever, weight loss,   RESPIRATORY: No cough, wheezing, chills or hemoptysis; No shortness of breath  CARDIOVASCULAR: No chest pain, palpitations, dizziness, or leg swelling  GASTROINTESTINAL: See above     Vital Signs Last 24 Hrs  T(C): 36.7 (15 Mar 2022 11:22), Max: 37.1 (14 Mar 2022 17:13)  T(F): 98 (15 Mar 2022 11:22), Max: 98.8 (14 Mar 2022 17:13)  HR: 90 (15 Mar 2022 11:22) (88 - 96)  BP: 154/75 (15 Mar 2022 11:22) (148/- - 158/82)  BP(mean): --  RR: 19 (15 Mar 2022 11:22) (18 - 19)  SpO2: 95% (15 Mar 2022 11:22) (94% - 99%)    PHYSICAL EXAM:  GENERAL: NAD, well-groomed, well-developed  NERVOUS SYSTEM:  Alert & Oriented X3, Good concentration; Motor Strength 5/5 B/L upper and lower extremities; DTRs 2+ intact and symmetric  CHEST/LUNG: Clear to percussion bilaterally; No rales, rhonchi, wheezing, or rubs  HEART: Regular rate and rhythm; No murmurs, rubs, or gallops  ABDOMEN: Soft, Nontender, Nondistended; Bowel sounds present      LAB        CBC:  03-13 @ 06:50  WBC 9.51   Hgb 10.9   Hct 33.9   Plts 235  MCV 89.0  03-12 @ 07:22  WBC 9.14   Hgb 11.2   Hct 33.7   Plts 207  MCV 87.5  03-11 @ 06:29  WBC 7.16   Hgb 11.2   Hct 35.2   Plts 193  MCV 90.5  03-10 @ 06:36  WBC 6.80   Hgb 11.4   Hct 35.5   Plts 175  MCV 89.6  03-09 @ 06:55  WBC 7.00   Hgb 10.8   Hct 34.0   Plts 172  MCV 90.9      Chemistry:  03-13 @ 06:50  Na+ 139  K+ 4.0  Cl- 110  CO2 24  BUN 11  Cr 0.62     03-12 @ 07:22  Na+ 137  K+ 4.1  Cl- 109  CO2 24  BUN 10  Cr 0.67     03-11 @ 06:29  Na+ 139  K+ 4.3  Cl- 108  CO2 25  BUN 9  Cr 0.73     03-10 @ 06:36  Na+ 141  K+ 4.4  Cl- 112  CO2 24  BUN 9  Cr 0.59     03-09 @ 06:55  Na+ 144  K+ 3.3  Cl- 113  CO2 25  BUN 10  Cr 0.66         Glucose, Serum: 99 mg/dL (03-13 @ 06:50)  Glucose, Serum: 110 mg/dL (03-12 @ 07:22)  Glucose, Serum: 101 mg/dL (03-11 @ 06:29)  Glucose, Serum: 96 mg/dL (03-10 @ 06:36)  Glucose, Serum: 95 mg/dL (03-09 @ 06:55)      13 Mar 2022 06:50    139    |  110    |  11     ----------------------------<  99     4.0     |  24     |  0.62   12 Mar 2022 07:22    137    |  109    |  10     ----------------------------<  110    4.1     |  24     |  0.67   11 Mar 2022 06:29    139    |  108    |  9      ----------------------------<  101    4.3     |  25     |  0.73   10 Mar 2022 06:36    141    |  112    |  9      ----------------------------<  96     4.4     |  24     |  0.59   09 Mar 2022 06:55    144    |  113    |  10     ----------------------------<  95     3.3     |  25     |  0.66     Ca    8.6        13 Mar 2022 06:50  Ca    8.6        12 Mar 2022 07:22  Ca    9.1        11 Mar 2022 06:29  Ca    9.0        10 Mar 2022 06:36  Ca    8.3        09 Mar 2022 06:55    TPro  7.1    /  Alb  2.6    /  TBili  0.5    /  DBili  0.2    /  AST  33     /  ALT  23     /  AlkPhos  73     11 Mar 2022 06:29              CAPILLARY BLOOD GLUCOSE              RADIOLOGY & ADDITIONAL TESTS:    Imaging Personally Reviewed:  [ ] YES  [ ] NO    Consultant(s) Notes Reviewed:  [ ] YES  [ ] NO    Care Discussed with Consultants/Other Providers [ ] YES  [ ] NO
HPI:  87yo, WF with h/o  HLD, Depression, HTN, Lasix for LE edema BIBEMS from custodial  for nausea, vomiting  and diarrhea x 3 days. non-bloody diarrhea x 3 episodes per pt, vomiting and persistent nausea - no noted previous abdominal surgeries or scars on examination -non-distended - passing gas. Patient denies sick contacts.  Denies CP, SOB, PND, cough, palpitation, chills, weakness, abdominal pain, dysuria ,HA, dizziness.    In ER,  rectal temp of 103 F, low grade tachycardia 103 Sinus tachycardia on EKG.     (06 Mar 2022 13:59)  clinically better  has had profuse watery diarrhea per rn     Allergies    No Known Allergies    Intolerances        MEDICATIONS  (STANDING):  amLODIPine   Tablet 10 milliGRAM(s) Oral daily  cholestyramine Powder (Sugar-Free) 4 Gram(s) Oral two times a day  heparin   Injectable 5000 Unit(s) SubCutaneous every 12 hours    MEDICATIONS  (PRN):  acetaminophen     Tablet .. 650 milliGRAM(s) Oral every 6 hours PRN Temp greater or equal to 38C (100.4F), Mild Pain (1 - 3)  aluminum hydroxide/magnesium hydroxide/simethicone Suspension 30 milliLiter(s) Oral every 4 hours PRN Dyspepsia  melatonin 3 milliGRAM(s) Oral at bedtime PRN Insomnia  ondansetron Injectable 4 milliGRAM(s) IV Push every 8 hours PRN Nausea and/or Vomiting      REVIEW OF SYSTEMS:  poor historian   CONSTITUTIONAL: No fever, chills, weight loss, or fatigue  HEENT: No sore throat, runny nose, ear ache  RESPIRATORY: No cough, wheezing, No shortness of breath  CARDIOVASCULAR: No chest pain, palpitations, dizziness  GASTROINTESTINAL: No abdominal pain.   diarrhea plus  GENITOURINARY: No dysuria, increase frequency, hematuria, or incontinence  NEUROLOGICAL: No headaches, memory loss, loss of strength, numbness, or tremors, no weakness  EXTREMITY: No pedal edema BLE  SKIN: No itching, burning, rashes, or lesions     VITAL SIGNS:  T(C): 36.9 (03-11-22 @ 11:25), Max: 37.7 (03-10-22 @ 16:45)  T(F): 98.4 (03-11-22 @ 11:25), Max: 99.8 (03-10-22 @ 16:45)  HR: 80 (03-11-22 @ 11:25) (75 - 80)  BP: 158/83 (03-11-22 @ 11:25) (151/77 - 162/80)  RR: 19 (03-11-22 @ 11:25) (18 - 19)  SpO2: 96% (03-11-22 @ 11:25) (92% - 96%)  Wt(kg): --    PHYSICAL EXAM:    GENERAL: not in any distress  HEENT: Neck is supple, normocephalic, atraumatic   CHEST/LUNG: Clear to auscultation bilaterally; No rales, rhonchi, wheezing  HEART: Regular rate and rhythm; No murmurs, rubs, or gallops  ABDOMEN: Soft, Nontender, Nondistended; Bowel sounds present, no rebound   EXTREMITIES:  2+ Peripheral Pulses, No clubbing, cyanosis, or edema  SKIN: No rashes or lesions  BACK: no pressor sore   NERVOUS SYSTEM:  Alert & Oriented X2  LABS:                         11.2   7.16  )-----------( 193      ( 11 Mar 2022 06:29 )             35.2     03-11    139  |  108  |  9   ----------------------------<  101<H>  4.3   |  25  |  0.73    Ca    9.1      11 Mar 2022 06:29    TPro  7.1  /  Alb  2.6<L>  /  TBili  0.5  /  DBili  0.2  /  AST  33  /  ALT  23  /  AlkPhos  73  03-11    LIVER FUNCTIONS - ( 11 Mar 2022 06:29 )  Alb: 2.6 g/dL / Pro: 7.1 gm/dL / ALK PHOS: 73 U/L / ALT: 23 U/L / AST: 33 U/L / GGT: x                     Thyroid Stimulating Hormone, Serum: 3.610 uIU/mL (03-08 @ 06:36)              C Diff by PCR Result: NotDetec (03-08 @ 15:25)    Culture Results:   Norovirus GI/GII  DETECTED by PCR  *******Please Note:*******  GI panel PCR evaluates for:  Campylobacter, Plesiomonas shigelloides, Salmonella,  Vibrio, Yersinia enterocolitica, Enteroaggregative  Escherichia coli (EAEC), Enteropathogenic E.coli (EPEC),  Enterotoxigenic E. coli (ETEC) lt/st, Shiga-like  toxin-producing E. coli (STEC) stx1/stx2,  Shigella/ Enteroinvasive E. coli (EIEC), Cryptosporidium,  Cyclospora cayetanensis, Entamoeba histolytica,  Giardia lamblia, Adenovirus F 40/41, Astrovirus,  Norovirus GI/GII, Rotavirus A, Sapovirus (03-08 @ 14:50)  Culture Results:   No enteric pathogens isolated.  (Stool culture examined for Salmonella,  Shigella, Campylobacter, Aeromonas, Plesiomonas,  Vibrio, E.coli O157 and Yersinia) (03-08 @ 14:50)  Culture Results:   No Protozoa seen by trichrome stain  No Helminths or Protozoa seen in formalin concentrate  performed by iodine stain  (routine O+P not evaluated for Microsporidia,  Cryptosporidia, Cyclospora, or Isospora.)  One negative sample does not necessarily rule  out the presence of a parasitic infection.  Moderate Yeast like cells seen (03-08 @ 14:49)  Culture Results:   <10,000 CFU/mL Normal Urogenital Radha (03-08 @ 00:34)  Culture Results:   Culture grew 3 or more types of organisms which indicate  collection contamination; consider recollection only if clinically  indicated. (03-06 @ 13:03)  Culture Results:   No Growth Final (03-06 @ 12:13)  Culture Results:   No Growth Final (03-06 @ 12:13)                Radiology:      
HPI:  85yo, WF with h/o  HLD, Depression, HTN, Lasix for LE edema BIBEMS from [facility]  for nausea, vomiting  and diarrhea x 3 days. non-bloody diarrhea x 3 episodes per pt, vomiting and persistent nausea - no noted previous abdominal surgeries or scars on examination -non-distended - passing gas. Patient denies sick contacts.  Denies CP, SOB, PND, cough, palpitation, chills, weakness, abdominal pain, dysuria ,HA, dizziness.    In ER,  rectal temp of 103 F, low grade tachycardia 103 Sinus tachycardia on EKG.     (06 Mar 2022 13:59)      Allergies    No Known Allergies    Intolerances        MEDICATIONS  (STANDING):  amLODIPine   Tablet 10 milliGRAM(s) Oral daily  cholestyramine Powder (Sugar-Free) 4 Gram(s) Oral two times a day  heparin   Injectable 5000 Unit(s) SubCutaneous every 12 hours    MEDICATIONS  (PRN):  acetaminophen     Tablet .. 650 milliGRAM(s) Oral every 6 hours PRN Temp greater or equal to 38C (100.4F), Mild Pain (1 - 3)  aluminum hydroxide/magnesium hydroxide/simethicone Suspension 30 milliLiter(s) Oral every 4 hours PRN Dyspepsia  melatonin 3 milliGRAM(s) Oral at bedtime PRN Insomnia  ondansetron Injectable 4 milliGRAM(s) IV Push every 8 hours PRN Nausea and/or Vomiting      REVIEW OF SYSTEMS:    CONSTITUTIONAL: No fever, chills, weight loss, or fatigue  HEENT: No sore throat, runny nose, ear ache  RESPIRATORY: No cough, wheezing, No shortness of breath  CARDIOVASCULAR: No chest pain, palpitations, dizziness  GASTROINTESTINAL: No abdominal pain. No nausea, vomiting, diarrhea  GENITOURINARY: No dysuria, increase frequency, hematuria, or incontinence  NEUROLOGICAL: No headaches, memory loss, loss of strength, numbness, or tremors, no weakness  EXTREMITY: No pedal edema BLE  SKIN: No itching, burning, rashes, or lesions     VITAL SIGNS:  T(C): 37.1 (03-10-22 @ 11:16), Max: 37.1 (03-09-22 @ 16:54)  T(F): 98.7 (03-10-22 @ 11:16), Max: 98.8 (03-09-22 @ 16:54)  HR: 77 (03-10-22 @ 11:16) (70 - 80)  BP: 147/79 (03-10-22 @ 11:16) (147/79 - 170/83)  RR: 19 (03-10-22 @ 11:16) (17 - 19)  SpO2: 94% (03-10-22 @ 11:16) (92% - 94%)  Wt(kg): --    PHYSICAL EXAM:    GENERAL: not in any distress  HEENT: Neck is supple, normocephalic, atraumatic   CHEST/LUNG: Clear to auscultation bilaterally; No rales, rhonchi, wheezing  HEART: Regular rate and rhythm; No murmurs, rubs, or gallops  ABDOMEN: Soft, Nontender, Nondistended; Bowel sounds present, no rebound   EXTREMITIES:  2+ Peripheral Pulses, No clubbing, cyanosis, or edema  SKIN: No rashes or lesions  BACK: no pressor sore   NERVOUS SYSTEM:  Alert & Oriented X2      LABS:                         11.4   6.80  )-----------( 175      ( 10 Mar 2022 06:36 )             35.5     03-10    141  |  112<H>  |  9   ----------------------------<  96  4.4   |  24  |  0.59    Ca    9.0      10 Mar 2022 06:36                  Thyroid Stimulating Hormone, Serum: 3.610 uIU/mL (03-08 @ 06:36)              C Diff by PCR Result: NotDetec (03-08 @ 15:25)    Culture Results:   Norovirus GI/GII  DETECTED by PCR  *******Please Note:*******  GI panel PCR evaluates for:  Campylobacter, Plesiomonas shigelloides, Salmonella,  Vibrio, Yersinia enterocolitica, Enteroaggregative  Escherichia coli (EAEC), Enteropathogenic E.coli (EPEC),  Enterotoxigenic E. coli (ETEC) lt/st, Shiga-like  toxin-producing E. coli (STEC) stx1/stx2,  Shigella/ Enteroinvasive E. coli (EIEC), Cryptosporidium,  Cyclospora cayetanensis, Entamoeba histolytica,  Giardia lamblia, Adenovirus F 40/41, Astrovirus,  Norovirus GI/GII, Rotavirus A, Sapovirus (03-08 @ 14:50)  Culture Results:   No enteric pathogens to date: Final culture pending (03-08 @ 14:50)  Culture Results:   Testing in progress (03-08 @ 14:49)  Culture Results:   <10,000 CFU/mL Normal Urogenital Radha (03-08 @ 00:34)  Culture Results:   Culture grew 3 or more types of organisms which indicate  collection contamination; consider recollection only if clinically  indicated. (03-06 @ 13:03)  Culture Results:   No growth to date. (03-06 @ 12:13)  Culture Results:   No growth to date. (03-06 @ 12:13)                Radiology:      
Patient is a 86y old  Female who presents with a chief complaint of N/V/D (09 Mar 2022 09:20)      SUBJECTIVE/OBJECTIVE:    Without complaints. Patient resting comfortably.     MEDICATIONS  (STANDING):  cefTRIAXone   IVPB      cefTRIAXone   IVPB 1000 milliGRAM(s) IV Intermittent every 24 hours  cholestyramine Powder (Sugar-Free) 4 Gram(s) Oral two times a day  heparin   Injectable 5000 Unit(s) SubCutaneous every 12 hours  potassium chloride    Tablet ER 40 milliEquivalent(s) Oral every 4 hours    MEDICATIONS  (PRN):  acetaminophen     Tablet .. 650 milliGRAM(s) Oral every 6 hours PRN Temp greater or equal to 38C (100.4F), Mild Pain (1 - 3)  aluminum hydroxide/magnesium hydroxide/simethicone Suspension 30 milliLiter(s) Oral every 4 hours PRN Dyspepsia  melatonin 3 milliGRAM(s) Oral at bedtime PRN Insomnia  ondansetron Injectable 4 milliGRAM(s) IV Push every 8 hours PRN Nausea and/or Vomiting      Allergies    No Known Allergies    Intolerances          Vital Signs Last 24 Hrs  T(C): 37.3 (09 Mar 2022 11:18), Max: 38.3 (08 Mar 2022 16:10)  T(F): 99.1 (09 Mar 2022 11:18), Max: 100.9 (08 Mar 2022 16:10)  HR: 64 (09 Mar 2022 11:18) (64 - 88)  BP: 156/75 (09 Mar 2022 11:18) (144/81 - 157/82)  BP(mean): --  RR: 19 (09 Mar 2022 11:18) (18 - 20)  SpO2: 94% (09 Mar 2022 11:18) (91% - 96%)      Physical Exam: PHYSICAL EXAM:  GENERAL: NAD,   HEAD:  Atraumatic, Normocephalic  EYES:  PERRLA, conjunctiva and sclera clear  ENMT:   Dry mucous membranes, No lesions  NECK: Supple,   NERVOUS SYSTEM:  Alert ; Motor Strength 4/5   CHEST/LUNG: Clear bilaterally; No rales, rhonchi, wheezing, or rubs  HEART: Regular rate and rhythm; No murmurs, rubs, or gallops  ABDOMEN: Soft, Nontender, Nondistended; Bowel sounds present  EXTREMITIES:  No clubbing, cyanosis.   trace edema  SKIN: No rashes or lesions              LABS:                        10.8   7.00  )-----------( 172      ( 09 Mar 2022 06:55 )             34.0     03-09    144  |  113<H>  |  10  ----------------------------<  95  3.3<L>   |  25  |  0.66    Ca    8.3<L>      09 Mar 2022 06:55          Culture Results:   Norovirus GI/GII  DETECTED by PCR  *******Please Note:*******  GI panel PCR evaluates for:  Campylobacter, Plesiomonas shigelloides, Salmonella,  Vibrio, Yersinia enterocolitica, Enteroaggregative  Escherichia coli (EAEC), Enteropathogenic E.coli (EPEC),  Enterotoxigenic E. coli (ETEC) lt/st, Shiga-like  toxin-producing E. coli (STEC) stx1/stx2,  Shigella/ Enteroinvasive E. coli (EIEC), Cryptosporidium,  Cyclospora cayetanensis, Entamoeba histolytica,  Giardia lamblia, Adenovirus F 40/41, Astrovirus,  Norovirus GI/GII, Rotavirus A, Sapovirus (03-08 @ 14:50)  Culture Results:   Testing in progress (03-08 @ 14:49)  Culture Results:   <10,000 CFU/mL Normal Urogenital Radha (03-08 @ 00:34)              RADIOLOGY & ADDITIONAL TESTS:        Consultant(s) Notes Reviewed:  [ xxx] YES  [ ] NO  
Patient is a 86y old  Female who presents with a chief complaint of N/V/D (14 Mar 2022 12:51)      HPI:  87yo, WF with h/o  HLD, Depression, HTN, Lasix for LE edema BIBEMS from [facility]  for nausea, vomiting  and diarrhea x 3 days. non-bloody diarrhea x 3 episodes per pt, vomiting and persistent nausea - no noted previous abdominal surgeries or scars on examination -non-distended - passing gas. Patient denies sick contacts.  Denies CP, SOB, PND, cough, palpitation, chills, weakness, abdominal pain, dysuria ,HA, dizziness.    In ER,  rectal temp of 103 F, low grade tachycardia 103 Sinus tachycardia on EKG.     (06 Mar 2022 13:59)      INTERVAL HPI/OVERNIGHT EVENTS: Patient seen earlier today  No BM today. The patient / nurse deny melena, hematochezia, hematemesis, nausea, vomiting, abdominal pain, constipation, diarrhea, or change in bowel movements     MEDICATIONS  (STANDING):  amLODIPine   Tablet 10 milliGRAM(s) Oral daily  cholestyramine Powder (Sugar-Free) 4 Gram(s) Oral two times a day  heparin   Injectable 5000 Unit(s) SubCutaneous every 12 hours    MEDICATIONS  (PRN):  acetaminophen     Tablet .. 650 milliGRAM(s) Oral every 6 hours PRN Temp greater or equal to 38C (100.4F), Mild Pain (1 - 3)  melatonin 3 milliGRAM(s) Oral at bedtime PRN Insomnia  ondansetron Injectable 4 milliGRAM(s) IV Push every 8 hours PRN Nausea and/or Vomiting      FAMILY HISTORY:      Allergies    No Known Allergies    Intolerances        PMH/PSH:        REVIEW OF SYSTEMS:  CONSTITUTIONAL: No fever, weight loss, or fatigue  EYES: No eye pain, visual disturbances, or discharge  ENMT:  No difficulty hearing, tinnitus, vertigo; No sinus or throat pain  NECK: No pain or stiffness  BREASTS: No pain, masses, or nipple discharge  RESPIRATORY: No cough, wheezing, chills or hemoptysis; No shortness of breath  CARDIOVASCULAR: No chest pain, palpitations, dizziness, or leg swelling  GASTROINTESTINAL: See above   GENITOURINARY: No dysuria, frequency, hematuria, or incontinence  NEUROLOGICAL: No headaches, , numbness, or tremors  SKIN: No itching, burning, rashes, or lesions   LYMPH NODES: No enlarged glands  ENDOCRINE: No heat or cold intolerance; No hair loss  MUSCULOSKELETAL: No joint pain or swelling; No muscle, back, or extremity pain  PSYCHIATRIC: No depression, anxiety, mood swings, or difficulty sleeping  HEME/LYMPH: No easy bruising, or bleeding gums  ALLERGY AND IMMUNOLOGIC: No hives or eczema    Vital Signs Last 24 Hrs  T(C): 37.1 (14 Mar 2022 17:13), Max: 37.1 (14 Mar 2022 17:13)  T(F): 98.8 (14 Mar 2022 17:13), Max: 98.8 (14 Mar 2022 17:13)  HR: 88 (14 Mar 2022 17:13) (84 - 91)  BP: 148/- (14 Mar 2022 17:13) (146/74 - 154/84)  BP(mean): --  RR: 18 (14 Mar 2022 17:13) (17 - 18)  SpO2: 99% (14 Mar 2022 17:13) (94% - 99%)    PHYSICAL EXAM:  GENERAL: NAD, well-groomed, well-developed  HEAD:  Atraumatic, Normocephalic  EYES: EOMI, PERRLA, conjunctiva and sclera clear  NECK: Supple, No JVD, Normal thyroid  NERVOUS SYSTEM:  Alert & Oriented X3, Good concentration;   CHEST/LUNG: Clear to percussion bilaterally; No rales, rhonchi, wheezing, or rubs  HEART: Regular rate and rhythm; No murmurs, rubs, or gallops  ABDOMEN: Soft, Nontender, Nondistended; Bowel sounds present  EXTREMITIES:  2+ Peripheral Pulses, No clubbing, cyanosis, or edema  LYMPH: No lymphadenopathy noted  SKIN: No rashes or lesions    LAB                          10.9   9.51  )-----------( 235      ( 13 Mar 2022 06:50 )             33.9       CBC:  03-13 @ 06:50  WBC 9.51   Hgb 10.9   Hct 33.9   Plts 235  MCV 89.0  03-12 @ 07:22  WBC 9.14   Hgb 11.2   Hct 33.7   Plts 207  MCV 87.5  03-11 @ 06:29  WBC 7.16   Hgb 11.2   Hct 35.2   Plts 193  MCV 90.5  03-10 @ 06:36  WBC 6.80   Hgb 11.4   Hct 35.5   Plts 175  MCV 89.6  03-09 @ 06:55  WBC 7.00   Hgb 10.8   Hct 34.0   Plts 172  MCV 90.9  03-08 @ 06:36  WBC 6.45   Hgb 10.4   Hct 34.0   Plts 154  MCV 94.7      Chemistry:  03-13 @ 06:50  Na+ 139  K+ 4.0  Cl- 110  CO2 24  BUN 11  Cr 0.62     03-12 @ 07:22  Na+ 137  K+ 4.1  Cl- 109  CO2 24  BUN 10  Cr 0.67     03-11 @ 06:29  Na+ 139  K+ 4.3  Cl- 108  CO2 25  BUN 9  Cr 0.73     03-10 @ 06:36  Na+ 141  K+ 4.4  Cl- 112  CO2 24  BUN 9  Cr 0.59     03-09 @ 06:55  Na+ 144  K+ 3.3  Cl- 113  CO2 25  BUN 10  Cr 0.66     03-08 @ 06:36  Na+ 142  K+ 3.5  Cl- 113  CO2 25  BUN 11  Cr 0.66         Glucose, Serum: 99 mg/dL (03-13 @ 06:50)  Glucose, Serum: 110 mg/dL (03-12 @ 07:22)  Glucose, Serum: 101 mg/dL (03-11 @ 06:29)  Glucose, Serum: 96 mg/dL (03-10 @ 06:36)  Glucose, Serum: 95 mg/dL (03-09 @ 06:55)  Glucose, Serum: 97 mg/dL (03-08 @ 06:36)      13 Mar 2022 06:50    139    |  110    |  11     ----------------------------<  99     4.0     |  24     |  0.62   12 Mar 2022 07:22    137    |  109    |  10     ----------------------------<  110    4.1     |  24     |  0.67   11 Mar 2022 06:29    139    |  108    |  9      ----------------------------<  101    4.3     |  25     |  0.73   10 Mar 2022 06:36    141    |  112    |  9      ----------------------------<  96     4.4     |  24     |  0.59   09 Mar 2022 06:55    144    |  113    |  10     ----------------------------<  95     3.3     |  25     |  0.66   08 Mar 2022 06:36    142    |  113    |  11     ----------------------------<  97     3.5     |  25     |  0.66     Ca    8.6        13 Mar 2022 06:50  Ca    8.6        12 Mar 2022 07:22  Ca    9.1        11 Mar 2022 06:29  Ca    9.0        10 Mar 2022 06:36  Ca    8.3        09 Mar 2022 06:55  Ca    7.9        08 Mar 2022 06:36    TPro  7.1    /  Alb  2.6    /  TBili  0.5    /  DBili  0.2    /  AST  33     /  ALT  23     /  AlkPhos  73     11 Mar 2022 06:29              CAPILLARY BLOOD GLUCOSE          C-Reactive Protein, Serum: 41 mg/L (03-08 @ 09:28)      RADIOLOGY & ADDITIONAL TESTS:    Imaging Personally Reviewed:  [ ] YES  [ ] NO    Consultant(s) Notes Reviewed:  [ ] YES  [ ] NO    Care Discussed with Consultants/Other Providers [ ] YES  [ ] NO
Patient is a 86y old  Female who presents with a chief complaint of N/V/D (09 Mar 2022 12:20)      HPI:  87yo, WF with h/o  HLD, Depression, HTN, Lasix for LE edema BIBEMS from [facility]  for nausea, vomiting  and diarrhea x 3 days. non-bloody diarrhea x 3 episodes per pt, vomiting and persistent nausea - no noted previous abdominal surgeries or scars on examination -non-distended - passing gas. Patient denies sick contacts.  Denies CP, SOB, PND, cough, palpitation, chills, weakness, abdominal pain, dysuria ,HA, dizziness.    In ER,  rectal temp of 103 F, low grade tachycardia 103 Sinus tachycardia on EKG.     (06 Mar 2022 13:59)      INTERVAL HPI/OVERNIGHT EVENTS: Patient seen earlier today  The nurse denies melena, hematochezia, hematemesis, nausea, vomiting, abdominal pain, constipation, diarrhea, or change in bowel movements Tolerated full liquid diet    MEDICATIONS  (STANDING):  amLODIPine   Tablet 10 milliGRAM(s) Oral daily  cefTRIAXone   IVPB      cefTRIAXone   IVPB 1000 milliGRAM(s) IV Intermittent every 24 hours  cholestyramine Powder (Sugar-Free) 4 Gram(s) Oral two times a day  heparin   Injectable 5000 Unit(s) SubCutaneous every 12 hours  potassium chloride    Tablet ER 40 milliEquivalent(s) Oral every 4 hours    MEDICATIONS  (PRN):  acetaminophen     Tablet .. 650 milliGRAM(s) Oral every 6 hours PRN Temp greater or equal to 38C (100.4F), Mild Pain (1 - 3)  aluminum hydroxide/magnesium hydroxide/simethicone Suspension 30 milliLiter(s) Oral every 4 hours PRN Dyspepsia  melatonin 3 milliGRAM(s) Oral at bedtime PRN Insomnia  ondansetron Injectable 4 milliGRAM(s) IV Push every 8 hours PRN Nausea and/or Vomiting      FAMILY HISTORY:      Allergies    No Known Allergies    Intolerances        PMH/PSH:        REVIEW OF SYSTEMS:  CONSTITUTIONAL: No fever, weight loss, or fatigue  EYES: No eye pain, visual disturbances, or discharge  ENMT:  No difficulty hearing, tinnitus, vertigo; No sinus or throat pain  NECK: No pain or stiffness  BREASTS: No pain, masses, or nipple discharge  RESPIRATORY: No cough, wheezing, chills or hemoptysis; No shortness of breath  CARDIOVASCULAR: No chest pain, palpitations, dizziness, or leg swelling  GASTROINTESTINAL: See above   GENITOURINARY: No dysuria, frequency, hematuria, or incontinence  NEUROLOGICAL: No headaches, numbness, or tremors  SKIN: No itching, burning, rashes, or lesions   LYMPH NODES: No enlarged glands  ENDOCRINE: No heat or cold intolerance; No hair loss  MUSCULOSKELETAL: No joint pain or swelling; No muscle, back, or extremity pain  PSYCHIATRIC: No depression, anxiety, mood swings, or difficulty sleeping  HEME/LYMPH: No easy bruising, or bleeding gums  ALLERGY AND IMMUNOLOGIC: No hives or eczema    Vital Signs Last 24 Hrs  T(C): 37.1 (09 Mar 2022 16:54), Max: 37.8 (08 Mar 2022 21:38)  T(F): 98.8 (09 Mar 2022 16:54), Max: 100.1 (08 Mar 2022 21:38)  HR: 70 (09 Mar 2022 16:54) (64 - 88)  BP: 170/83 (09 Mar 2022 16:54) (144/81 - 170/83)  BP(mean): --  RR: 17 (09 Mar 2022 16:54) (17 - 19)  SpO2: 92% (09 Mar 2022 16:54) (92% - 96%)    PHYSICAL EXAM:  GENERAL: NAD, well-groomed, well-developed  HEAD:  Atraumatic, Normocephalic  EYES: EOMI, PERRLA, conjunctiva and sclera clear  NECK: Supple, No JVD, Normal thyroid  NERVOUS SYSTEM:  Alert & Oriented X1, Good concentration;   CHEST/LUNG: Clear to percussion bilaterally; No rales, rhonchi, wheezing, or rubs  HEART: Regular rate and rhythm; No murmurs, rubs, or gallops  ABDOMEN: Soft, Nontender, Nondistended; Bowel sounds present  EXTREMITIES:  2+ Peripheral Pulses, No clubbing, cyanosis, or edema  LYMPH: No lymphadenopathy noted  SKIN: No rashes or lesions    LAB                          10.8   7.00  )-----------( 172      ( 09 Mar 2022 06:55 )             34.0       CBC:  03-09 @ 06:55  WBC 7.00   Hgb 10.8   Hct 34.0   Plts 172  MCV 90.9  03-08 @ 06:36  WBC 6.45   Hgb 10.4   Hct 34.0   Plts 154  MCV 94.7  03-07 @ 06:26  WBC 6.12   Hgb 10.3   Hct 34.0   Plts 143  MCV 95.2  03-06 @ 08:47  WBC 13.88   Hgb 12.3   Hct 39.1   Plts 199  MCV 93.1      Chemistry:  03-09 @ 06:55  Na+ 144  K+ 3.3  Cl- 113  CO2 25  BUN 10  Cr 0.66     03-08 @ 06:36  Na+ 142  K+ 3.5  Cl- 113  CO2 25  BUN 11  Cr 0.66     03-07 @ 06:26  Na+ 144  K+ 3.6  Cl- 111  CO2 28  BUN 18  Cr 0.92     03-06 @ 08:47  Na+ 143  K+ 3.9  Cl- 110  CO2 30  BUN 24  Cr 1.02         Glucose, Serum: 95 mg/dL (03-09 @ 06:55)  Glucose, Serum: 97 mg/dL (03-08 @ 06:36)  Glucose, Serum: 109 mg/dL (03-07 @ 06:26)  Glucose, Serum: 122 mg/dL (03-06 @ 08:47)      09 Mar 2022 06:55    144    |  113    |  10     ----------------------------<  95     3.3     |  25     |  0.66   08 Mar 2022 06:36    142    |  113    |  11     ----------------------------<  97     3.5     |  25     |  0.66   07 Mar 2022 06:26    144    |  111    |  18     ----------------------------<  109    3.6     |  28     |  0.92   06 Mar 2022 08:47    143    |  110    |  24     ----------------------------<  122    3.9     |  30     |  1.02     Ca    8.3        09 Mar 2022 06:55  Ca    7.9        08 Mar 2022 06:36  Ca    8.0        07 Mar 2022 06:26  Ca    9.3        06 Mar 2022 08:47    TPro  6.3    /  Alb  2.3    /  TBili  0.3    /  DBili  x      /  AST  25     /  ALT  15     /  AlkPhos  71     07 Mar 2022 06:26  TPro  7.8    /  Alb  3.0    /  TBili  0.4    /  DBili  x      /  AST  21     /  ALT  13     /  AlkPhos  108    06 Mar 2022 08:47              CAPILLARY BLOOD GLUCOSE          C-Reactive Protein, Serum: 41 mg/L (03-08 @ 09:28)      RADIOLOGY & ADDITIONAL TESTS:    Imaging Personally Reviewed:  [ ] YES  [ ] NO    Consultant(s) Notes Reviewed:  [ ] YES  [ ] NO    Care Discussed with Consultants/Other Providers [ ] YES  [ ] NO
Patient is a 86y old  Female who presents with a chief complaint of N/V/D (14 Mar 2022 09:30)      SUBJECTIVE/OBJECTIVE:    Without complaints. Patient resting comfortably.     MEDICATIONS  (STANDING):  amLODIPine   Tablet 10 milliGRAM(s) Oral daily  cholestyramine Powder (Sugar-Free) 4 Gram(s) Oral two times a day  heparin   Injectable 5000 Unit(s) SubCutaneous every 12 hours    MEDICATIONS  (PRN):  acetaminophen     Tablet .. 650 milliGRAM(s) Oral every 6 hours PRN Temp greater or equal to 38C (100.4F), Mild Pain (1 - 3)  melatonin 3 milliGRAM(s) Oral at bedtime PRN Insomnia  ondansetron Injectable 4 milliGRAM(s) IV Push every 8 hours PRN Nausea and/or Vomiting      Allergies    No Known Allergies    Intolerances          Vital Signs Last 24 Hrs  T(C): 36.9 (14 Mar 2022 11:59), Max: 37.3 (13 Mar 2022 17:04)  T(F): 98.4 (14 Mar 2022 11:59), Max: 99.1 (13 Mar 2022 17:04)  HR: 84 (14 Mar 2022 11:59) (80 - 91)  BP: 146/74 (14 Mar 2022 11:59) (136/77 - 154/84)  BP(mean): --  RR: 18 (14 Mar 2022 11:59) (17 - 18)  SpO2: 95% (14 Mar 2022 11:59) (93% - 95%)                PHYSICAL EXAM:  GENERAL: NAD, well-groomed, well-developed  HEAD:  Atraumatic, Normocephalic  EYES: , PERRLA, conjunctiva and sclera clear  ENMT: Intact  NECK: Supple, No JVD, Normal thyroid  NERVOUS SYSTEM:  Alert & Oriented X3; Motor Strength 4/5 B/L   CHEST/LUNG: Clear bilaterally; No rales, rhonchi, wheezing, or rubs  HEART: Regular rate and rhythm; No murmurs, rubs, or gallops  ABDOMEN: Soft, Nontender, Nondistended; Bowel sounds present  EXTREMITIES:   No clubbing, cyanosis.   Trace edema  SKIN: No rashes or lesions    LABS:                        10.9   9.51  )-----------( 235      ( 13 Mar 2022 06:50 )             33.9     03-13    139  |  110<H>  |  11  ----------------------------<  99  4.0   |  24  |  0.62    Ca    8.6      13 Mar 2022 06:50          CAPILLARY BLOOD GLUCOSE              RADIOLOGY & ADDITIONAL TESTS:        Consultant(s) Notes Reviewed:  [xxx ] YES  [ ] NO  
Patient is a 86y old  Female who presents with a chief complaint of N/V/D (10 Mar 2022 15:40)      SUBJECTIVE/OBJECTIVE:    Without complaints. Patient resting comfortably.     MEDICATIONS  (STANDING):  amLODIPine   Tablet 10 milliGRAM(s) Oral daily  cholestyramine Powder (Sugar-Free) 4 Gram(s) Oral two times a day  heparin   Injectable 5000 Unit(s) SubCutaneous every 12 hours    MEDICATIONS  (PRN):  acetaminophen     Tablet .. 650 milliGRAM(s) Oral every 6 hours PRN Temp greater or equal to 38C (100.4F), Mild Pain (1 - 3)  aluminum hydroxide/magnesium hydroxide/simethicone Suspension 30 milliLiter(s) Oral every 4 hours PRN Dyspepsia  melatonin 3 milliGRAM(s) Oral at bedtime PRN Insomnia  ondansetron Injectable 4 milliGRAM(s) IV Push every 8 hours PRN Nausea and/or Vomiting      Allergies    No Known Allergies    Intolerances          Vital Signs Last 24 Hrs  T(C): 36.9 (11 Mar 2022 11:25), Max: 37.7 (10 Mar 2022 16:45)  T(F): 98.4 (11 Mar 2022 11:25), Max: 99.8 (10 Mar 2022 16:45)  HR: 80 (11 Mar 2022 11:25) (75 - 80)  BP: 158/83 (11 Mar 2022 11:25) (151/77 - 162/80)  BP(mean): --  RR: 19 (11 Mar 2022 11:25) (18 - 19)  SpO2: 96% (11 Mar 2022 11:25) (92% - 96%)    PHYSICAL EXAM:  GENERAL: NAD, well-groomed, well-developed  HEAD:  Atraumatic, Normocephalic  EYES: EOMI, PERRLA, conjunctiva and sclera clear  ENMT: No tonsillar erythema, exudates, or enlargement; Moist mucous membranes, No lesions  NECK: Supple, No JVD, Normal thyroid  NERVOUS SYSTEM:  Alert & Oriented X3; Motor Strength 5/5 B/L upper and lower extremities; DTRs 2+ intact and symmetric  CHEST/LUNG: Clear bilaterally; No rales, rhonchi, wheezing, or rubs  HEART: Regular rate and rhythm; No murmurs, rubs, or gallops  ABDOMEN: Soft, Nontender, Nondistended; Bowel sounds present  EXTREMITIES:  2+ Peripheral Pulses, No clubbing, cyanosis, or edema  LYMPH: No lymphadenopathy noted  SKIN: No rashes or lesions    LABS:                        11.2   7.16  )-----------( 193      ( 11 Mar 2022 06:29 )             35.2     03-11    139  |  108  |  9   ----------------------------<  101<H>  4.3   |  25  |  0.73    Ca    9.1      11 Mar 2022 06:29    TPro  7.1  /  Alb  2.6<L>  /  TBili  0.5  /  DBili  0.2  /  AST  33  /  ALT  23  /  AlkPhos  73  03-11        CAPILLARY BLOOD GLUCOSE              RADIOLOGY & ADDITIONAL TESTS:        Consultant(s) Notes Reviewed:  [xxx ] YES  [ ] NO  
Patient is a 86y old  Female who presents with a chief complaint of N/V/D (09 Mar 2022 18:47)      SUBJECTIVE/OBJECTIVE:  Without complaints. Patient resting comfortably.     MEDICATIONS  (STANDING):  amLODIPine   Tablet 10 milliGRAM(s) Oral daily  cholestyramine Powder (Sugar-Free) 4 Gram(s) Oral two times a day  heparin   Injectable 5000 Unit(s) SubCutaneous every 12 hours    MEDICATIONS  (PRN):  acetaminophen     Tablet .. 650 milliGRAM(s) Oral every 6 hours PRN Temp greater or equal to 38C (100.4F), Mild Pain (1 - 3)  aluminum hydroxide/magnesium hydroxide/simethicone Suspension 30 milliLiter(s) Oral every 4 hours PRN Dyspepsia  melatonin 3 milliGRAM(s) Oral at bedtime PRN Insomnia  ondansetron Injectable 4 milliGRAM(s) IV Push every 8 hours PRN Nausea and/or Vomiting      Allergies    No Known Allergies    Intolerances          Vital Signs Last 24 Hrs  T(C): 37.1 (10 Mar 2022 11:16), Max: 37.1 (09 Mar 2022 16:54)  T(F): 98.7 (10 Mar 2022 11:16), Max: 98.8 (09 Mar 2022 16:54)  HR: 77 (10 Mar 2022 11:16) (70 - 80)  BP: 147/79 (10 Mar 2022 11:16) (147/79 - 170/83)  BP(mean): --  RR: 19 (10 Mar 2022 11:16) (17 - 19)  SpO2: 94% (10 Mar 2022 11:16) (92% - 94%)              Physical Exam: PHYSICAL EXAM:  GENERAL: NAD,   HEAD:  Atraumatic, Normocephalic  EYES:  PERRLA, conjunctiva and sclera clear  ENMT:   Dry mucous membranes, No lesions  NECK: Supple,   NERVOUS SYSTEM:  Alert ; Motor Strength 4/5   CHEST/LUNG: Clear bilaterally; No rales, rhonchi, wheezing, or rubs  HEART: Regular rate and rhythm; No murmurs, rubs, or gallops  ABDOMEN: Soft, Nontender, Nondistended; Bowel sounds present  EXTREMITIES:  No clubbing, cyanosis.   trace edema  SKIN: No rashes or lesions    LABS:                        11.4   6.80  )-----------( 175      ( 10 Mar 2022 06:36 )             35.5     03-10    141  |  112<H>  |  9   ----------------------------<  96  4.4   |  24  |  0.59    Ca    9.0      10 Mar 2022 06:36          CAPILLARY BLOOD GLUCOSE              RADIOLOGY & ADDITIONAL TESTS:        Consultant(s) Notes Reviewed:  [vvv ] YES  [ ] NO

## 2022-03-15 NOTE — PROGRESS NOTE ADULT - ASSESSMENT
HPI:  87yo, WF with h/o  HLD, Depression, HTN, Lasix for LE edema BIBEMS from custodial  for nausea, vomiting  and diarrhea x 3 days. non-bloody diarrhea x 3 episodes per pt, vomiting and persistent nausea - no noted previous abdominal surgeries or scars on examination -non-distended - passing gas. Patient denies sick contacts.  Denies CP, SOB, PND, cough, palpitation, chills, weakness, abdominal pain, dysuria ,HA, dizziness.    In ER,  rectal temp of 103 F, low grade tachycardia 103 Sinus tachycardia on EKG.     (06 Mar 2022 13:59)  ----- As Above -------- Patient is a fair historian. Could not contact family  Patient presented with N/V, abdominal pain and diarrhea x 3 days. Febrile (+)Non bloody. No one else with same symptoms. No history of IBD.   Prior to these symptoms, the patient denies melena, hematochezia, hematemesis, nausea, vomiting, abdominal pain, constipation, diarrhea, or change in bowel movements   Colonoscopy (?)       A) N/V, abdominal pain, diarrhea, fever, abnormal CT scan - c/w Norovirus !! - Symptoms have completely resolved  C Diff negative On solid diet.   CRP 41  B) Anemia - Acute drop probably from rehydration - Today's are stable  Blood work seen   HPI:  85yo, WF with h/o  HLD, Depression, HTN, Lasix for LE edema BIBEMS from senior living  for nausea, vomiting  and diarrhea x 3 days. non-bloody diarrhea x 3 episodes per pt, vomiting and persistent nausea - no noted previous abdominal surgeries or scars on examination -non-distended - passing gas. Patient denies sick contacts.  Denies CP, SOB, PND, cough, palpitation, chills, weakness, abdominal pain, dysuria ,HA, dizziness.    In ER,  rectal temp of 103 F, low grade tachycardia 103 Sinus tachycardia on EKG.     (06 Mar 2022 13:59)  ----- As Above -------- Patient is a fair historian. Could not contact family  Patient presented with N/V, abdominal pain and diarrhea x 3 days. Febrile (+)Non bloody. No one else with same symptoms. No history of IBD.   Prior to these symptoms, the patient denies melena, hematochezia, hematemesis, nausea, vomiting, abdominal pain, constipation, diarrhea, or change in bowel movements   Colonoscopy (?)       A) N/V, abdominal pain, diarrhea, fever, abnormal CT scan - c/w Norovirus !! - Symptoms have completely resolved  C Diff negative On solid diet.   CRP 41  B) Anemia - Acute drop probably from rehydration - Today's are stable  Blood work seen  ==== Patient stable from GI point of for discharge

## 2022-03-15 NOTE — DISCHARGE NOTE NURSING/CASE MANAGEMENT/SOCIAL WORK - PATIENT PORTAL LINK FT
You can access the FollowMyHealth Patient Portal offered by [facility] by registering at the following website: [email or web address]. By joining [facility]’s FollowMyHealth portal, you will also be able to view your health information using other applications (apps) compatible with our system.

## 2022-03-15 NOTE — DISCHARGE NOTE NURSING/CASE MANAGEMENT/SOCIAL WORK - NSDCPEFALRISK_GEN_ALL_CORE
For information on Fall & Injury Prevention, visit: [email or web address] OR  [email or web address] OR  https://www.cdc.gov/steadi/patient.html

## 2022-03-15 NOTE — PROGRESS NOTE ADULT - REASON FOR ADMISSION
N/V/D

## 2022-03-17 DIAGNOSIS — I10 ESSENTIAL (PRIMARY) HYPERTENSION: ICD-10-CM

## 2022-03-17 DIAGNOSIS — E78.5 HYPERLIPIDEMIA, UNSPECIFIED: ICD-10-CM

## 2022-03-17 DIAGNOSIS — R11.2 NAUSEA WITH VOMITING, UNSPECIFIED: ICD-10-CM

## 2022-03-17 DIAGNOSIS — R60.0 LOCALIZED EDEMA: ICD-10-CM

## 2022-03-17 DIAGNOSIS — A41.9 SEPSIS, UNSPECIFIED ORGANISM: ICD-10-CM

## 2022-03-17 DIAGNOSIS — A08.11 ACUTE GASTROENTEROPATHY DUE TO NORWALK AGENT: ICD-10-CM

## 2022-03-17 DIAGNOSIS — E86.0 DEHYDRATION: ICD-10-CM

## 2022-03-17 DIAGNOSIS — F32.A DEPRESSION, UNSPECIFIED: ICD-10-CM

## 2022-03-17 DIAGNOSIS — N39.0 URINARY TRACT INFECTION, SITE NOT SPECIFIED: ICD-10-CM

## 2022-07-05 NOTE — PROGRESS NOTE ADULT - ASSESSMENT
[FreeTextEntry1] : R shoulder possible posterior subluxation event.\par MRI reviewed- ant humeral bone edema, possible posterior dislocation, no labral tear\par Trial of PT.\par Activity modification.\par RTO 6 weeks.  Admitted for Sepsis 2/2 UTI and Enteritis, Dehydration.

## 2022-10-14 NOTE — PROGRESS NOTE ADULT - PROBLEM SELECTOR PROBLEM 1
Sepsis, unspecified organism
Yes

## 2024-12-18 NOTE — PATIENT PROFILE ADULT - MONEY FOR FOOD
Dorian Brown MD Responsible Provider: Bridget Delong DO    Anesthesia Type: MAC ASA Status: 3            Anesthesia Type: MAC    Rosa Phase I: Rosa Score: 10    Rosa Phase II:      Anesthesia Post Evaluation    Patient location during evaluation: PACU  Patient participation: complete - patient participated  Level of consciousness: awake and alert  Nausea & Vomiting: no vomiting and no nausea  Cardiovascular status: hemodynamically stable  Respiratory status: acceptable and spontaneous ventilation  Hydration status: stable  Pain management: adequate    No notable events documented.  
no